# Patient Record
Sex: FEMALE | Race: WHITE | Employment: UNEMPLOYED | URBAN - METROPOLITAN AREA
[De-identification: names, ages, dates, MRNs, and addresses within clinical notes are randomized per-mention and may not be internally consistent; named-entity substitution may affect disease eponyms.]

---

## 2021-09-04 ENCOUNTER — HOSPITAL ENCOUNTER (EMERGENCY)
Age: 21
Discharge: HOME | End: 2021-09-05
Attending: EMERGENCY MEDICINE

## 2021-09-04 DIAGNOSIS — R45.851 SUICIDAL IDEATION: ICD-10-CM

## 2021-09-04 DIAGNOSIS — T74.21XA SEXUAL ASSAULT OF ADULT, INITIAL ENCOUNTER: Primary | ICD-10-CM

## 2021-09-04 DIAGNOSIS — F10.929 ACUTE ALCOHOLIC INTOXICATION WITH COMPLICATION (HCC): ICD-10-CM

## 2021-09-04 LAB
BILIRUBIN URINE: NEGATIVE
BLOOD, URINE: NEGATIVE
CLARITY: CLEAR
COLOR: YELLOW
GLUCOSE URINE: NEGATIVE MG/DL
HCG(URINE) PREGNANCY TEST: NEGATIVE
KETONES, URINE: NEGATIVE MG/DL
LEUKOCYTE ESTERASE, URINE: NEGATIVE
MICROSCOPIC EXAMINATION: NORMAL
NITRITE, URINE: NEGATIVE
PH UA: 7.5 (ref 5–8)
PROTEIN UA: NEGATIVE MG/DL
SPECIFIC GRAVITY UA: 1.01 (ref 1–1.03)
URINE REFLEX TO CULTURE: NORMAL
URINE TYPE: NORMAL
UROBILINOGEN, URINE: 0.2 E.U./DL

## 2021-09-04 PROCEDURE — 99285 EMERGENCY DEPT VISIT HI MDM: CPT

## 2021-09-04 PROCEDURE — 80143 DRUG ASSAY ACETAMINOPHEN: CPT

## 2021-09-04 PROCEDURE — 85025 COMPLETE CBC W/AUTO DIFF WBC: CPT

## 2021-09-04 PROCEDURE — 82077 ASSAY SPEC XCP UR&BREATH IA: CPT

## 2021-09-04 PROCEDURE — 6370000000 HC RX 637 (ALT 250 FOR IP): Performed by: EMERGENCY MEDICINE

## 2021-09-04 PROCEDURE — 84703 CHORIONIC GONADOTROPIN ASSAY: CPT

## 2021-09-04 PROCEDURE — 80179 DRUG ASSAY SALICYLATE: CPT

## 2021-09-04 PROCEDURE — 80307 DRUG TEST PRSMV CHEM ANLYZR: CPT

## 2021-09-04 PROCEDURE — 96372 THER/PROPH/DIAG INJ SC/IM: CPT

## 2021-09-04 PROCEDURE — 81003 URINALYSIS AUTO W/O SCOPE: CPT

## 2021-09-04 PROCEDURE — 80053 COMPREHEN METABOLIC PANEL: CPT

## 2021-09-04 RX ORDER — NICOTINE 21 MG/24HR
1 PATCH, TRANSDERMAL 24 HOURS TRANSDERMAL DAILY
Status: DISCONTINUED | OUTPATIENT
Start: 2021-09-05 | End: 2021-09-04

## 2021-09-04 RX ORDER — NICOTINE 21 MG/24HR
1 PATCH, TRANSDERMAL 24 HOURS TRANSDERMAL ONCE
Status: DISCONTINUED | OUTPATIENT
Start: 2021-09-04 | End: 2021-09-05 | Stop reason: HOSPADM

## 2021-09-05 VITALS
RESPIRATION RATE: 20 BRPM | TEMPERATURE: 97.5 F | HEART RATE: 84 BPM | DIASTOLIC BLOOD PRESSURE: 74 MMHG | OXYGEN SATURATION: 96 % | SYSTOLIC BLOOD PRESSURE: 115 MMHG

## 2021-09-05 LAB
A/G RATIO: 1.6 (ref 1.1–2.2)
ACETAMINOPHEN LEVEL: <5 UG/ML (ref 10–30)
ALBUMIN SERPL-MCNC: 4.6 G/DL (ref 3.4–5)
ALP BLD-CCNC: 71 U/L (ref 40–129)
ALT SERPL-CCNC: 9 U/L (ref 10–40)
AMPHETAMINE SCREEN, URINE: NORMAL
ANION GAP SERPL CALCULATED.3IONS-SCNC: 14 MMOL/L (ref 3–16)
AST SERPL-CCNC: 16 U/L (ref 15–37)
BARBITURATE SCREEN URINE: NORMAL
BASOPHILS ABSOLUTE: 0.1 K/UL (ref 0–0.2)
BASOPHILS RELATIVE PERCENT: 0.9 %
BENZODIAZEPINE SCREEN, URINE: NORMAL
BILIRUB SERPL-MCNC: 0.3 MG/DL (ref 0–1)
BUN BLDV-MCNC: 10 MG/DL (ref 7–20)
CALCIUM SERPL-MCNC: 9.3 MG/DL (ref 8.3–10.6)
CANNABINOID SCREEN URINE: NORMAL
CHLORIDE BLD-SCNC: 106 MMOL/L (ref 99–110)
CO2: 20 MMOL/L (ref 21–32)
COCAINE METABOLITE SCREEN URINE: NORMAL
CREAT SERPL-MCNC: 0.6 MG/DL (ref 0.6–1.1)
EKG ATRIAL RATE: 91 BPM
EKG DIAGNOSIS: NORMAL
EKG P AXIS: 63 DEGREES
EKG P-R INTERVAL: 180 MS
EKG Q-T INTERVAL: 356 MS
EKG QRS DURATION: 92 MS
EKG QTC CALCULATION (BAZETT): 437 MS
EKG R AXIS: 59 DEGREES
EKG T AXIS: 52 DEGREES
EKG VENTRICULAR RATE: 91 BPM
EOSINOPHILS ABSOLUTE: 0.1 K/UL (ref 0–0.6)
EOSINOPHILS RELATIVE PERCENT: 0.5 %
ETHANOL: 246 MG/DL (ref 0–0.08)
GFR AFRICAN AMERICAN: >60
GFR NON-AFRICAN AMERICAN: >60
GLOBULIN: 2.8 G/DL
GLUCOSE BLD-MCNC: 91 MG/DL (ref 70–99)
HCT VFR BLD CALC: 48.3 % (ref 36–48)
HEMOGLOBIN: 16.1 G/DL (ref 12–16)
LYMPHOCYTES ABSOLUTE: 3.8 K/UL (ref 1–5.1)
LYMPHOCYTES RELATIVE PERCENT: 27.6 %
Lab: NORMAL
MCH RBC QN AUTO: 29.9 PG (ref 26–34)
MCHC RBC AUTO-ENTMCNC: 33.2 G/DL (ref 31–36)
MCV RBC AUTO: 90.1 FL (ref 80–100)
METHADONE SCREEN, URINE: NORMAL
MONOCYTES ABSOLUTE: 0.6 K/UL (ref 0–1.3)
MONOCYTES RELATIVE PERCENT: 4.3 %
NEUTROPHILS ABSOLUTE: 9.2 K/UL (ref 1.7–7.7)
NEUTROPHILS RELATIVE PERCENT: 66.7 %
OPIATE SCREEN URINE: NORMAL
OXYCODONE URINE: NORMAL
PDW BLD-RTO: 13.8 % (ref 12.4–15.4)
PH UA: 7.5
PHENCYCLIDINE SCREEN URINE: NORMAL
PLATELET # BLD: 317 K/UL (ref 135–450)
PMV BLD AUTO: 7.4 FL (ref 5–10.5)
POTASSIUM SERPL-SCNC: 4 MMOL/L (ref 3.5–5.1)
PROPOXYPHENE SCREEN: NORMAL
RBC # BLD: 5.36 M/UL (ref 4–5.2)
SALICYLATE, SERUM: <0.3 MG/DL (ref 15–30)
SODIUM BLD-SCNC: 140 MMOL/L (ref 136–145)
TOTAL PROTEIN: 7.4 G/DL (ref 6.4–8.2)
WBC # BLD: 13.8 K/UL (ref 4–11)

## 2021-09-05 PROCEDURE — 93005 ELECTROCARDIOGRAM TRACING: CPT | Performed by: EMERGENCY MEDICINE

## 2021-09-05 PROCEDURE — 6360000002 HC RX W HCPCS: Performed by: EMERGENCY MEDICINE

## 2021-09-05 PROCEDURE — 93010 ELECTROCARDIOGRAM REPORT: CPT | Performed by: INTERNAL MEDICINE

## 2021-09-05 RX ORDER — HALOPERIDOL 5 MG/ML
5 INJECTION INTRAMUSCULAR ONCE
Status: COMPLETED | OUTPATIENT
Start: 2021-09-05 | End: 2021-09-05

## 2021-09-05 RX ORDER — DIPHENHYDRAMINE HYDROCHLORIDE 50 MG/ML
25 INJECTION INTRAMUSCULAR; INTRAVENOUS ONCE
Status: COMPLETED | OUTPATIENT
Start: 2021-09-05 | End: 2021-09-05

## 2021-09-05 RX ORDER — LORAZEPAM 2 MG/ML
2 INJECTION INTRAMUSCULAR ONCE
Status: COMPLETED | OUTPATIENT
Start: 2021-09-05 | End: 2021-09-05

## 2021-09-05 RX ADMIN — HALOPERIDOL LACTATE 5 MG: 5 INJECTION, SOLUTION INTRAMUSCULAR at 00:31

## 2021-09-05 RX ADMIN — LORAZEPAM 2 MG: 2 INJECTION INTRAMUSCULAR; INTRAVENOUS at 00:31

## 2021-09-05 RX ADMIN — DIPHENHYDRAMINE HYDROCHLORIDE 25 MG: 50 INJECTION, SOLUTION INTRAMUSCULAR; INTRAVENOUS at 00:31

## 2021-09-05 NOTE — ED NOTES
Provider instituted suicide precautions, this RN in room with patient as sitter and room made safe. Charge RN notified.       Dane Tamayo RN  09/04/21 0223

## 2021-09-05 NOTE — ED NOTES
Urine and blood work sent. Pt crying in bed. Unable to obtain VS or EKG at this time. Dr. Garo Bailon aware. Awaiting GABI NORTON. Sitter remains at bedside.      Silvia Markham RN  09/04/21 9331

## 2021-09-05 NOTE — ED NOTES
Pt medicated with no complications. Jerry Ferraro RN, Mg Hoffmann, Dr. Marely Goyal, Physician witness to the medication.       Oksana Perdue RN  09/05/21 9412

## 2021-09-05 NOTE — ED NOTES
While this RN in room with JUAN C Herrera [facility] to start suicide precautions and place pt in paper gown and her personal belongings in paper bag, pt actively declined, reporting she would not allow this RN to do anything for her and she did not want this RN to care for her. Melvina Garcia RN notified.       Shaun Rollins RN  09/04/21 0077

## 2021-09-05 NOTE — ED NOTES
JOSH NURSE of [address] [place] [address] contacted and message left for return call.       Sita Nation RN  09/04/21 6704

## 2021-09-05 NOTE — ED NOTES
This RN went to bedside to straight stick pt for labs. Pt refused and starting raising her voice and cussing at myself and the sitter. Pt adamantly refusing blood work, EKG and nicotine patch at this time. Pt states, \"Don't touch me and get the fuck out of my room. \" Charge RN, Fifi, aware and at bedside to speak with pt.      Shaylee Cruz RN  09/04/21 0922

## 2021-09-05 NOTE — ED NOTES
Covered for constant observer during small break. Assisted pt into more comfortable position. Gave 2 glasses of water. Pt tolerated well.      Rutherford Medici Naegele  09/05/21 0451

## 2021-09-05 NOTE — ED NOTES
While this RN was triaging pt, pt declined to answer any more questions, stated \"If I can't have a cigarette, then I am not answering your questions. \"      Connor Herrera RN  09/04/21 5553
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
## 2023-11-22 ENCOUNTER — LAB (OUTPATIENT)
Dept: LAB | Facility: LAB | Age: 23
End: 2023-11-22
Payer: MEDICAID

## 2023-11-22 ENCOUNTER — OFFICE VISIT (OUTPATIENT)
Dept: OBSTETRICS AND GYNECOLOGY | Facility: CLINIC | Age: 23
End: 2023-11-22
Payer: MEDICAID

## 2023-11-22 VITALS
BODY MASS INDEX: 33.31 KG/M2 | WEIGHT: 188 LBS | SYSTOLIC BLOOD PRESSURE: 120 MMHG | HEIGHT: 63 IN | DIASTOLIC BLOOD PRESSURE: 62 MMHG

## 2023-11-22 DIAGNOSIS — N91.2 AMENORRHEA: ICD-10-CM

## 2023-11-22 DIAGNOSIS — N91.2 AMENORRHEA: Primary | ICD-10-CM

## 2023-11-22 LAB
B-HCG SERPL-ACNC: ABNORMAL MIU/ML
SAC DIAMETER: 12.9 MM

## 2023-11-22 PROCEDURE — 76817 TRANSVAGINAL US OBSTETRIC: CPT | Performed by: OBSTETRICS & GYNECOLOGY

## 2023-11-22 PROCEDURE — 84702 CHORIONIC GONADOTROPIN TEST: CPT

## 2023-11-22 PROCEDURE — 1036F TOBACCO NON-USER: CPT | Performed by: OBSTETRICS & GYNECOLOGY

## 2023-11-22 PROCEDURE — 99203 OFFICE O/P NEW LOW 30 MIN: CPT | Performed by: OBSTETRICS & GYNECOLOGY

## 2023-11-22 PROCEDURE — 36415 COLL VENOUS BLD VENIPUNCTURE: CPT

## 2023-11-22 RX ORDER — BUPRENORPHINE HYDROCHLORIDE 8 MG/1
8 TABLET SUBLINGUAL DAILY
COMMUNITY
Start: 2023-11-21

## 2023-11-22 NOTE — PROGRESS NOTES
"  Mony Rea is a 23 y.o. year old female patient.  PCP = No primary care provider on file.    Chief Complaint   Patient presents with    Amenorrhea     New patient here due to amenorrhea. LMP: 09/24/23. Patient had a positive hCG at [facility] on 11/13/23. Patient c/o mild cramps and some breast tenderness. Patient denies any vaginal bleeding, nausea or vomiting.       HPI   Presents stating that she has not had a menstrual flow since September.  She has some mild cramps.  Denies any vaginal bleeding.  Has some breast tenderness.    OB History    No obstetric history on file.         History reviewed. No pertinent past medical history.    History reviewed. No pertinent surgical history.    Review of Systems:   Constitutional: No fever or chills  Respiratory: No shortness of breath, or cough  Cardiovascular: No chest pain or syncope  Breasts: No breast pain, no masses, no nipple discharge  Gastrointestinal: No nausea, vomiting, or diarrhea, no abdominal pain  Genitourinary: No dysuria or frequency  Gynecology: Negative except as noted in history of present illness  All other: All other systems reviewed and negative for complaint    Medication Documentation Review Audit       Reviewed by Kalin Connors MD (Physician) on 11/22/23 at 0942      Medication Order Taking? Sig Documenting Provider Last Dose Status   buprenorphine (Subtex) 8 mg [id number] Yes Place 1 tablet (8 mg) under the tongue once daily. Historical Provider, MD  Active   prenatal vitamin calcium-iron-folic 27 mg iron- 1 mg tablet [id number] Yes Take 1 tablet by mouth once daily. Historical Provider, MD  Active                     /62   Ht 1.6 m (5' 3\")   Wt 85.3 kg (188 lb)   LMP 09/24/2023   BMI 33.30 kg/m²     PHYSICAL EXAMINATION:  PHYSICAL EXAMINATION:  Well-developed, well nourished, in no acute distress, alert and oriented x three, is pleasant and cooperative.  HEENT: Clear. Pupils equal, round and reactive to light and " accommodation. Extraocular muscles are intact. Oral mucosa pink without exudate.   NECK: No lymphadenopathy, no thyromegaly.  LUNGS: Clear bilaterally.  HEART: Regular rate and rhythm without murmurs.  ABDOMEN: Normoactive bowel sounds, soft and nontender, no guarding or rebound tenderness, no CVA tenderness.  EXTREMITIES: No clubbing, cyanosis or edema.  NEUROLOGIC:  Cranial nerves II-XII grossly intact.  :  Normal external female genitalia, normal vulva, normal vagina. Normal urethral meatus, urethra and bladder.  Vaginal ultrasound shows an intrauterine gestational sac at 5 weeks 3 days without evidence of yolk sac or fetal pole.  No fluid in the cul-de-sac.      Problem List Items Addressed This Visit    None  Visit Diagnoses       Amenorrhea    -  Primary    Relevant Orders    hCG, quantitative    hCG, quantitative    US OB transvaginal (Completed)             Provider Impression:  1.  Amenorrhea  We will obtain serial hCGs and follow-up in 2 weeks for repeat ultrasound for viability.

## 2023-11-24 ENCOUNTER — LAB (OUTPATIENT)
Dept: LAB | Facility: LAB | Age: 23
End: 2023-11-24
Payer: MEDICAID

## 2023-11-24 DIAGNOSIS — N91.2 AMENORRHEA: ICD-10-CM

## 2023-11-24 LAB — B-HCG SERPL-ACNC: ABNORMAL MIU/ML

## 2023-11-24 PROCEDURE — 36415 COLL VENOUS BLD VENIPUNCTURE: CPT

## 2023-11-24 PROCEDURE — 84702 CHORIONIC GONADOTROPIN TEST: CPT

## 2023-11-24 NOTE — RESULT ENCOUNTER NOTE
Informed of the serial hCG values which have increased but not the typical doubling that is seen in the early part of pregnancy.  Recommend follow-up hCG on November 29.  Patient voices no complaints at this time.

## 2023-11-29 ENCOUNTER — LAB (OUTPATIENT)
Dept: LAB | Facility: LAB | Age: 23
End: 2023-11-29
Payer: MEDICAID

## 2023-11-29 DIAGNOSIS — N91.2 AMENORRHEA: ICD-10-CM

## 2023-11-29 LAB — B-HCG SERPL-ACNC: ABNORMAL MIU/ML

## 2023-11-29 PROCEDURE — 84702 CHORIONIC GONADOTROPIN TEST: CPT

## 2023-11-29 PROCEDURE — 36415 COLL VENOUS BLD VENIPUNCTURE: CPT

## 2023-11-29 NOTE — RESULT ENCOUNTER NOTE
Talked with patient today regarding the follow-up hCG which shows good rise up to 48,141.  Patient is doing well and she has a follow-up ultrasound for viability scheduled for next week.

## 2023-12-08 ENCOUNTER — OFFICE VISIT (OUTPATIENT)
Dept: OBSTETRICS AND GYNECOLOGY | Facility: CLINIC | Age: 23
End: 2023-12-08
Payer: MEDICAID

## 2023-12-08 VITALS
BODY MASS INDEX: 32.18 KG/M2 | DIASTOLIC BLOOD PRESSURE: 68 MMHG | HEIGHT: 63 IN | SYSTOLIC BLOOD PRESSURE: 112 MMHG | WEIGHT: 181.6 LBS

## 2023-12-08 DIAGNOSIS — N91.2 AMENORRHEA: Primary | ICD-10-CM

## 2023-12-08 LAB — CRL: 14.6 MM

## 2023-12-08 PROCEDURE — 76817 TRANSVAGINAL US OBSTETRIC: CPT | Performed by: OBSTETRICS & GYNECOLOGY

## 2023-12-08 PROCEDURE — 99213 OFFICE O/P EST LOW 20 MIN: CPT | Performed by: OBSTETRICS & GYNECOLOGY

## 2023-12-08 PROCEDURE — 1036F TOBACCO NON-USER: CPT | Performed by: OBSTETRICS & GYNECOLOGY

## 2023-12-08 PROCEDURE — H1000 PRENATAL CARE ATRISK ASSESSM: HCPCS | Performed by: OBSTETRICS & GYNECOLOGY

## 2023-12-08 NOTE — PROGRESS NOTES
"  Mony Rea is a 23 y.o. year old female patient.  PCP = No primary care provider on file.    Chief Complaint   Patient presents with    Pregnancy Ultrasound     Patient is here for viability ultrasound. Patient denies any vaginal bleeding or cramps.       HPI   Presents for transvaginal ultrasound for viability.  She voices no complaints and is doing well.  Denies any vaginal bleeding or abdominal pain.    OB History    No obstetric history on file.         History reviewed. No pertinent past medical history.    History reviewed. No pertinent surgical history.    Review of Systems:   Constitutional: No fever or chills  Respiratory: No shortness of breath, or cough  Cardiovascular: No chest pain or syncope  Breasts: No breast pain, no masses, no nipple discharge  Gastrointestinal: No nausea, vomiting, or diarrhea, no abdominal pain  Genitourinary: No dysuria or frequency  Gynecology: Negative except as noted in history of present illness  All other: All other systems reviewed and negative for complaint    Medication Documentation Review Audit       Reviewed by Kalin Connors MD (Physician) on 12/08/23 at 1321      Medication Order Taking? Sig Documenting Provider Last Dose Status   buprenorphine (Subtex) 8 mg [id number]  Place 1 tablet (8 mg) under the tongue once daily. Historical Provider, MD  Active   prenatal vitamin calcium-iron-folic 27 mg iron- 1 mg tablet [id number]  Take 1 tablet by mouth once daily. Historical Provider, MD  Active                     /68   Ht 1.6 m (5' 3\")   Wt 82.4 kg (181 lb 9.6 oz)   LMP 09/24/2023   BMI 32.17 kg/m²     PHYSICAL EXAMINATION:  Well-developed, well nourished, in no acute distress, alert and oriented x three, is pleasant and cooperative.  HEENT: Clear. Pupils equal, round and reactive to light and accommodation. Extraocular muscles are intact. Oral mucosa pink without exudate.   NECK: No lymphadenopathy, no thyromegaly.  LUNGS: Clear bilaterally.  HEART: " Regular rate and rhythm without murmurs.  ABDOMEN: Normoactive bowel sounds, soft and nontender, no guarding or rebound tenderness, no CVA tenderness.  EXTREMITIES: No clubbing, cyanosis or edema.  NEUROLOGIC:  Cranial nerves II-XII grossly intact.  :  Normal external female genitalia, normal vulva, normal vagina. Normal urethral meatus, urethra and bladder.  Vaginal ultrasound shows a live intrauterine pregnancy at 7 weeks 5 days gestation. EDC is July 21, 2024.    Orders Placed This Encounter   Procedures    US OB transvaginal     Order Specific Question:   Reason for exam:     Answer:   amenorrhea     Order Specific Question:   Radiologist to Determine Optimal Study     Answer:   Yes     Order Specific Question:   [facility]     Answer:   Immediate [1]     Order Specific Question:   Is this exam part of a Research Study? If Yes, link this order to the research study     Answer:   No        Problem List Items Addressed This Visit    None  Visit Diagnoses       Amenorrhea    -  Primary    Relevant Orders    US OB transvaginal (Completed)             Provider Impression:  1.  Amenorrhea  Follow-up in 4 weeks for new OB visit, cultures and prenatal labs.

## 2024-01-05 ENCOUNTER — INITIAL PRENATAL (OUTPATIENT)
Dept: OBSTETRICS AND GYNECOLOGY | Facility: CLINIC | Age: 24
End: 2024-01-05
Payer: MEDICAID

## 2024-01-05 ENCOUNTER — LAB (OUTPATIENT)
Dept: LAB | Facility: LAB | Age: 24
End: 2024-01-05
Payer: MEDICAID

## 2024-01-05 VITALS — DIASTOLIC BLOOD PRESSURE: 58 MMHG | WEIGHT: 178.6 LBS | SYSTOLIC BLOOD PRESSURE: 104 MMHG | BODY MASS INDEX: 31.64 KG/M2

## 2024-01-05 DIAGNOSIS — Z32.01 PREGNANCY TEST POSITIVE (HHS-HCC): ICD-10-CM

## 2024-01-05 DIAGNOSIS — Z32.01 PREGNANCY TEST POSITIVE (HHS-HCC): Primary | ICD-10-CM

## 2024-01-05 DIAGNOSIS — Z3A.11 11 WEEKS GESTATION OF PREGNANCY (HHS-HCC): ICD-10-CM

## 2024-01-05 DIAGNOSIS — O99.322 DRUG USE AFFECTING PREGNANCY IN SECOND TRIMESTER (HHS-HCC): ICD-10-CM

## 2024-01-05 LAB
ABO GROUP (TYPE) IN BLOOD: NORMAL
ANTIBODY SCREEN: NORMAL
ERYTHROCYTE [DISTWIDTH] IN BLOOD BY AUTOMATED COUNT: 11.9 % (ref 11.5–14.5)
HCT VFR BLD AUTO: 36.1 % (ref 36–46)
HGB BLD-MCNC: 12.1 G/DL (ref 12–16)
MCH RBC QN AUTO: 28.8 PG (ref 26–34)
MCHC RBC AUTO-ENTMCNC: 33.5 G/DL (ref 32–36)
MCV RBC AUTO: 86 FL (ref 80–100)
NRBC BLD-RTO: 0 /100 WBCS (ref 0–0)
PLATELET # BLD AUTO: 253 X10*3/UL (ref 150–450)
RBC # BLD AUTO: 4.2 X10*6/UL (ref 4–5.2)
RH FACTOR (ANTIGEN D): NORMAL
WBC # BLD AUTO: 9 X10*3/UL (ref 4.4–11.3)

## 2024-01-05 PROCEDURE — 80307 DRUG TEST PRSMV CHEM ANLYZR: CPT

## 2024-01-05 PROCEDURE — 99213 OFFICE O/P EST LOW 20 MIN: CPT | Performed by: OBSTETRICS & GYNECOLOGY

## 2024-01-05 PROCEDURE — 87086 URINE CULTURE/COLONY COUNT: CPT

## 2024-01-05 PROCEDURE — 87340 HEPATITIS B SURFACE AG IA: CPT

## 2024-01-05 PROCEDURE — 87624 HPV HI-RISK TYP POOLED RSLT: CPT

## 2024-01-05 PROCEDURE — 87800 DETECT AGNT MULT DNA DIREC: CPT

## 2024-01-05 PROCEDURE — 86901 BLOOD TYPING SEROLOGIC RH(D): CPT

## 2024-01-05 PROCEDURE — 87389 HIV-1 AG W/HIV-1&-2 AB AG IA: CPT

## 2024-01-05 PROCEDURE — 86850 RBC ANTIBODY SCREEN: CPT

## 2024-01-05 PROCEDURE — 88175 CYTOPATH C/V AUTO FLUID REDO: CPT

## 2024-01-05 PROCEDURE — 86317 IMMUNOASSAY INFECTIOUS AGENT: CPT

## 2024-01-05 PROCEDURE — 86780 TREPONEMA PALLIDUM: CPT

## 2024-01-05 PROCEDURE — 86900 BLOOD TYPING SEROLOGIC ABO: CPT

## 2024-01-05 PROCEDURE — 85027 COMPLETE CBC AUTOMATED: CPT

## 2024-01-05 PROCEDURE — 88141 CYTOPATH C/V INTERPRET: CPT | Performed by: PATHOLOGY

## 2024-01-05 PROCEDURE — 36415 COLL VENOUS BLD VENIPUNCTURE: CPT

## 2024-01-05 RX ORDER — METOCLOPRAMIDE 5 MG/1
5 TABLET ORAL 3 TIMES DAILY PRN
COMMUNITY
Start: 2023-12-11 | End: 2024-01-10

## 2024-01-05 NOTE — PROGRESS NOTES
Subjective   Patient ID: Mony Rea is a 23 y.o. female who presents for Initial Prenatal Visit (Patient is here for initial Prenatal visit. Patient has no concerns today. Glucose and Protein are negative in urine. ).  WARD Manning is a  1 para 0 who comes in at 11+ weeks gestation to start prenatal care.  She reports that overall she keeps most of what she eats down.  Denies any cramping bleeding or abnormal discharge but does report some breast tenderness.  She has not started taking her prenatal vitamins yet.  This is her first pregnancy.  She was previously on Suboxone and was recently switched to Subutex  Review of Systems  Denies any recent health changes  Respiratory denies any shortness of breath  Cardiovascular denies any chest pain or palpitations  GI denies any changes in bowel habits or abdominal pain   denies any new issues  Musculoskeletal denies any changes in her mobility  Psych denies any changes in her mood or in her sleep  Objective   Physical Exam  Pleasant in no acute distress  Head and neck supple without adenopathy  Lungs clear to auscultation  Heart regular rate and rhythm  Breast symmetrical no masses discharge retraction or skin changes  Abdomen soft nontender  External genitalia revealed no lesions the vagina was well estrogenized the cervix was nonfriable    Transabdominal ultrasound revealed a viable pregnancy with mobility and fetal heart tones at 158  Assessment/Plan     Kerri is a 23-year-old who comes in at 11+ weeks to establish prenatal care.  Full exam today was benign fetal heart tones were reassuring will get prenatal labs today patient advised to stop vaping.  Patient desires prequel scoring a screen we will do genetic screening as well as full prenatal labs.  Follow-up in 4 weeks       Karla Whitaker MD 24 2:15 PM

## 2024-01-06 LAB
AMPHETAMINES UR QL SCN: NORMAL
BARBITURATES UR QL SCN: NORMAL
BENZODIAZ UR QL SCN: NORMAL
BZE UR QL SCN: NORMAL
C TRACH RRNA SPEC QL NAA+PROBE: NEGATIVE
CANNABINOIDS UR QL SCN: NORMAL
FENTANYL+NORFENTANYL UR QL SCN: NORMAL
HBV SURFACE AG SERPL QL IA: NONREACTIVE
HIV 1+2 AB+HIV1 P24 AG SERPL QL IA: NONREACTIVE
N GONORRHOEA DNA SPEC QL PROBE+SIG AMP: NEGATIVE
OPIATES UR QL SCN: NORMAL
OXYCODONE+OXYMORPHONE UR QL SCN: NORMAL
PCP UR QL SCN: NORMAL
REFLEX ADDED, ANEMIA PANEL: NORMAL
RUBV IGG SERPL IA-ACNC: 1.5 IA
RUBV IGG SERPL QL IA: POSITIVE
T PALLIDUM AB SER QL: NONREACTIVE

## 2024-01-07 LAB — BACTERIA UR CULT: NORMAL

## 2024-01-19 ENCOUNTER — TELEPHONE (OUTPATIENT)
Dept: OBSTETRICS AND GYNECOLOGY | Facility: CLINIC | Age: 24
End: 2024-01-19
Payer: MEDICAID

## 2024-01-19 NOTE — TELEPHONE ENCOUNTER
----- Message from Karla Whitaker MD sent at 1/19/2024  9:58 AM EST -----  Please inform Kerri that her genetic screening came back normal and if she wishes to know the sex of the baby it is a boy

## 2024-01-31 DIAGNOSIS — B96.89 BV (BACTERIAL VAGINOSIS): Primary | ICD-10-CM

## 2024-01-31 DIAGNOSIS — N76.0 BV (BACTERIAL VAGINOSIS): Primary | ICD-10-CM

## 2024-01-31 RX ORDER — METRONIDAZOLE 500 MG/1
500 TABLET ORAL 2 TIMES DAILY
Qty: 14 TABLET | Refills: 0 | Status: SHIPPED | OUTPATIENT
Start: 2024-01-31 | End: 2024-02-07

## 2024-02-02 ENCOUNTER — ROUTINE PRENATAL (OUTPATIENT)
Dept: OBSTETRICS AND GYNECOLOGY | Facility: CLINIC | Age: 24
End: 2024-02-02
Payer: MEDICAID

## 2024-02-02 VITALS — BODY MASS INDEX: 31.32 KG/M2 | DIASTOLIC BLOOD PRESSURE: 64 MMHG | SYSTOLIC BLOOD PRESSURE: 110 MMHG | WEIGHT: 176.8 LBS

## 2024-02-02 DIAGNOSIS — Z3A.15 15 WEEKS GESTATION OF PREGNANCY (HHS-HCC): Primary | ICD-10-CM

## 2024-02-02 PROCEDURE — 99213 OFFICE O/P EST LOW 20 MIN: CPT | Performed by: OBSTETRICS & GYNECOLOGY

## 2024-02-02 NOTE — PROGRESS NOTES
Subjective   Patient ID [id number]   Mony Rea is a 24 y.o.  at 15w5d with a working estimated date of delivery of 2024, by Ultrasound who presents for a routine prenatal visit. She denies vaginal bleeding, leakage of fluid, decreased fetal movements, or contractions.    Chief Complaint   Patient presents with    Routine Prenatal Visit     Patient has no concerns at this time. Patient unable to leave a urine sample today.           Objective   Physical Exam  Weight: 80.2 kg (176 lb 12.8 oz)  Expected Total Weight Gain: 5 kg (11 lb)-9 kg (19 lb)   Pregravid BMI: 31.54  BP: 110/64  Fetal Heart Rate: 150 Fundal Height (cm): 15 cm    Prenatal Labs    Lab Results   Component Value Date    HGB 12.1 2024    HCT 36.1 2024    ABO A 2024    HEPBSAG Nonreactive 2024       Assessment/Plan   Problem List Items Addressed This Visit    None  Visit Diagnoses       15 weeks gestation of pregnancy    -  Primary    Relevant Orders    US OB 14+ weeks anatomy scan            Continue prenatal vitamin.  Labs reviewed.    Patient had the prequel testing which was normal.  Recent Pap smear shows mild dysplasia and evidence of bacterial vaginosis.  Patient has noticed some slight vaginal discharge.  A prescription for Flagyl will be sent to the pharmacy.  Patient informed of the Pap smear results and recommend scheduling colposcopy.  Follow up in 4 weeks for a routine prenatal visit.

## 2024-03-01 ENCOUNTER — HOSPITAL ENCOUNTER (OUTPATIENT)
Dept: RADIOLOGY | Facility: HOSPITAL | Age: 24
Discharge: HOME | End: 2024-03-01
Payer: MEDICAID

## 2024-03-01 ENCOUNTER — ROUTINE PRENATAL (OUTPATIENT)
Dept: OBSTETRICS AND GYNECOLOGY | Facility: CLINIC | Age: 24
End: 2024-03-01
Payer: MEDICAID

## 2024-03-01 VITALS — DIASTOLIC BLOOD PRESSURE: 64 MMHG | BODY MASS INDEX: 30.93 KG/M2 | SYSTOLIC BLOOD PRESSURE: 120 MMHG | WEIGHT: 174.6 LBS

## 2024-03-01 DIAGNOSIS — Z3A.15 15 WEEKS GESTATION OF PREGNANCY (HHS-HCC): ICD-10-CM

## 2024-03-01 DIAGNOSIS — Z3A.19 19 WEEKS GESTATION OF PREGNANCY (HHS-HCC): Primary | ICD-10-CM

## 2024-03-01 PROCEDURE — 99213 OFFICE O/P EST LOW 20 MIN: CPT | Performed by: OBSTETRICS & GYNECOLOGY

## 2024-03-01 PROCEDURE — 76805 OB US >/= 14 WKS SNGL FETUS: CPT

## 2024-03-01 PROCEDURE — 76805 OB US >/= 14 WKS SNGL FETUS: CPT | Performed by: RADIOLOGY

## 2024-03-01 NOTE — PROGRESS NOTES
Subjective   Patient ID [id number]   Mony Rea is a 24 y.o.  at 19w5d with a working estimated date of delivery of 2024, by Ultrasound who presents for a routine prenatal visit. She denies vaginal bleeding, leakage of fluid, decreased fetal movements, or contractions.    Chief Complaint   Patient presents with    Routine Prenatal Visit     Patient is here for an OB visit. Patient unable to leave a urine today.           Objective   Physical Exam  Weight: 79.2 kg (174 lb 9.6 oz)  Expected Total Weight Gain: 5 kg (11 lb)-9 kg (19 lb)   Pregravid BMI: 31.54  BP: 120/64  Fetal Heart Rate: 150 Fundal Height (cm): 19 cm    Prenatal Labs    Lab Results   Component Value Date    HGB 12.1 2024    HCT 36.1 2024    ABO A 2024    HEPBSAG Nonreactive 2024       Assessment/Plan   Problem List Items Addressed This Visit    None  Visit Diagnoses       19 weeks gestation of pregnancy    -  Primary            Continue prenatal vitamin.  Labs reviewed.    Follow up in 4 weeks for a routine prenatal visit.

## 2024-03-06 ENCOUNTER — PROCEDURE VISIT (OUTPATIENT)
Dept: OBSTETRICS AND GYNECOLOGY | Facility: CLINIC | Age: 24
End: 2024-03-06
Payer: MEDICAID

## 2024-03-06 VITALS — BODY MASS INDEX: 30.82 KG/M2 | WEIGHT: 174 LBS | DIASTOLIC BLOOD PRESSURE: 62 MMHG | SYSTOLIC BLOOD PRESSURE: 108 MMHG

## 2024-03-06 DIAGNOSIS — Z3A.20 20 WEEKS GESTATION OF PREGNANCY (HHS-HCC): Primary | ICD-10-CM

## 2024-03-06 DIAGNOSIS — N87.0 CIN I (CERVICAL INTRAEPITHELIAL NEOPLASIA I): ICD-10-CM

## 2024-03-06 PROCEDURE — 57452 EXAM OF CERVIX W/SCOPE: CPT | Performed by: OBSTETRICS & GYNECOLOGY

## 2024-03-06 NOTE — PROGRESS NOTES
Patient ID: Mony Rea is a 24 y.o. female.  Presents for colposcopy due to mild dysplasia.  She feels fetal movement.  Chief Complaint   Patient presents with    Colposcopy     OB patient here for colp. 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 Patient is unable to leave a urine sample today. Patient has no concerns at this time.        /62   Wt 78.9 kg (174 lb)   LMP 09/24/2023   BMI 30.82 kg/m²      Colposcopy    Date/Time: 3/6/2024 1:23 PM    Performed by: Kalin Connors MD  Authorized by: Kalin Connors MD    Procedure location: cervix    Consent:     Patient questions answered: yes      Consent obtained:  Verbal    Consent given by:  Patient  Indication:     Cervical indication(s): RAYSHAWN 1    Pre-procedure:     Prep solution(s): acetic acid    Procedure:     Colposcopy with: colposcopy only      Biopsy taken: no      Cervix visibility: fully visualized      SCJ visibility: fully visualized      Lesion visualized: fully visualized      Acetowhite lesion(s): cervix    Post-procedure:     Patient tolerance of procedure:  Patient tolerated the procedure well with no immediate complications  Comments:      Recommend follow-up colposcopy after delivery of the pregnancy.  Follow-up in several weeks for her routine OB visit.      Physical Exam  Genitourinary:

## 2024-03-29 ENCOUNTER — ROUTINE PRENATAL (OUTPATIENT)
Dept: OBSTETRICS AND GYNECOLOGY | Facility: CLINIC | Age: 24
End: 2024-03-29
Payer: MEDICAID

## 2024-03-29 VITALS — WEIGHT: 178.2 LBS | BODY MASS INDEX: 31.57 KG/M2 | DIASTOLIC BLOOD PRESSURE: 68 MMHG | SYSTOLIC BLOOD PRESSURE: 110 MMHG

## 2024-03-29 DIAGNOSIS — Z3A.23 23 WEEKS GESTATION OF PREGNANCY (HHS-HCC): Primary | ICD-10-CM

## 2024-03-29 PROCEDURE — 99213 OFFICE O/P EST LOW 20 MIN: CPT | Performed by: OBSTETRICS & GYNECOLOGY

## 2024-03-29 NOTE — PROGRESS NOTES
Subjective   Patient ID [id number]   Mony Rea is a 24 y.o.  at 23w5d with a working estimated date of delivery of 2024, by Ultrasound who presents for a routine prenatal visit. She denies vaginal bleeding, leakage of fluid, decreased fetal movements, or contractions.    Chief Complaint   Patient presents with    Routine Prenatal Visit     Patient has no concerns at this time. Urine is negative for glucose and protein. Patient given information on the 1 hour GTT.        Objective   Physical Exam  Weight: 80.8 kg (178 lb 3.2 oz)  Expected Total Weight Gain: 5 kg (11 lb)-9 kg (19 lb)   Pregravid BMI: 31.54  BP: 110/68  Fetal Heart Rate: 146 Fundal Height (cm): 23 cm    Prenatal Labs    Lab Results   Component Value Date    HGB 12.1 2024    HCT 36.1 2024    ABO A 2024    HEPBSAG Nonreactive 2024       Assessment/Plan       Continue prenatal vitamin.  Labs reviewed.    Follow up in 4 weeks for a routine prenatal visit.

## 2024-04-04 ENCOUNTER — HOSPITAL ENCOUNTER (EMERGENCY)
Facility: HOSPITAL | Age: 24
Discharge: OTHER INSTITUTION | End: 2024-04-04
Attending: EMERGENCY MEDICINE
Payer: MEDICAID

## 2024-04-04 VITALS
DIASTOLIC BLOOD PRESSURE: 84 MMHG | OXYGEN SATURATION: 100 % | TEMPERATURE: 97.6 F | RESPIRATION RATE: 18 BRPM | WEIGHT: 180 LBS | HEIGHT: 63 IN | SYSTOLIC BLOOD PRESSURE: 138 MMHG | BODY MASS INDEX: 31.89 KG/M2 | HEART RATE: 96 BPM

## 2024-04-04 DIAGNOSIS — Z3A.24 24 WEEKS GESTATION OF PREGNANCY (HHS-HCC): Primary | ICD-10-CM

## 2024-04-04 DIAGNOSIS — O46.92 VAGINAL BLEEDING IN PREGNANCY, SECOND TRIMESTER (HHS-HCC): ICD-10-CM

## 2024-04-04 PROCEDURE — 99281 EMR DPT VST MAYX REQ PHY/QHP: CPT

## 2024-04-04 ASSESSMENT — COLUMBIA-SUICIDE SEVERITY RATING SCALE - C-SSRS
1. IN THE PAST MONTH, HAVE YOU WISHED YOU WERE DEAD OR WISHED YOU COULD GO TO SLEEP AND NOT WAKE UP?: NO
2. HAVE YOU ACTUALLY HAD ANY THOUGHTS OF KILLING YOURSELF?: NO
6. HAVE YOU EVER DONE ANYTHING, STARTED TO DO ANYTHING, OR PREPARED TO DO ANYTHING TO END YOUR LIFE?: NO

## 2024-04-04 ASSESSMENT — PAIN SCALES - GENERAL
PAINLEVEL_OUTOF10: 0 - NO PAIN
PAINLEVEL_OUTOF10: 1

## 2024-04-04 ASSESSMENT — PAIN - FUNCTIONAL ASSESSMENT
PAIN_FUNCTIONAL_ASSESSMENT: 0-10
PAIN_FUNCTIONAL_ASSESSMENT: 0-10

## 2024-04-04 ASSESSMENT — PAIN DESCRIPTION - LOCATION: LOCATION: ABDOMEN

## 2024-04-04 NOTE — ED PROVIDER NOTES
Vaginal bleeding.  This 24-year-old white female who is 24 weeks 4 days pregnant presents to the ED with a gush of blood that occurred at 7 AM this morning.  She states that she has not had any further bleeding since then she does admit to some bilateral groin pain and she does admit to having sexual intercourse last evening.  She normally sees Dr. Connors but is supposed to deliver at [place].  She is  1 para 0 Ab0.  Believes she is a Rh-.  She denies any further bleeding since the initial gush of blood.  She denies any contractions.      History provided by:  Patient   used: No         Physical Exam  Vitals and nursing note reviewed.   Constitutional:       General: She is awake.      Appearance: Normal appearance. She is normal weight.   HENT:      Head: Normocephalic and atraumatic.      Right Ear: Hearing and external ear normal.      Left Ear: Hearing and external ear normal.      Nose: Nose normal. No congestion or rhinorrhea.      Mouth/Throat:      Lips: Pink.      Mouth: Mucous membranes are moist.      Pharynx: Oropharynx is clear. No oropharyngeal exudate or posterior oropharyngeal erythema.   Eyes:      General: Lids are normal.         Right eye: No discharge.         Left eye: No discharge.      Extraocular Movements: Extraocular movements intact.      Conjunctiva/sclera: Conjunctivae normal.      Pupils: Pupils are equal, round, and reactive to light.   Cardiovascular:      Rate and Rhythm: Normal rate and regular rhythm.      Pulses: Normal pulses.      Heart sounds: Normal heart sounds. No murmur heard.     No friction rub. No gallop.   Pulmonary:      Effort: Pulmonary effort is normal. No respiratory distress.      Breath sounds: Normal breath sounds. No stridor. No wheezing, rhonchi or rales.   Chest:      Chest wall: No tenderness.   Abdominal:      General: Abdomen is protuberant. Bowel sounds are normal. There is no distension.      Palpations: Abdomen is soft.  There is no mass.      Tenderness: There is no abdominal tenderness. There is no guarding or rebound.      Hernia: No hernia is present.      Comments: Patient's abdomen is noted to be gravid nontender without evidence of contractions during evaluation.  Bowel sounds present all 4 quadrants.   Musculoskeletal:         General: No swelling, tenderness, deformity or signs of injury. Normal range of motion.      Cervical back: Full passive range of motion without pain, normal range of motion and neck supple.      Right lower leg: No edema.      Left lower leg: No edema.   Skin:     General: Skin is warm and dry.      Capillary Refill: Capillary refill takes less than 2 seconds.      Coloration: Skin is not jaundiced or pale.      Findings: No bruising, erythema, lesion or rash.   Neurological:      General: No focal deficit present.      Mental Status: She is alert and oriented to person, place, and time.      GCS: GCS eye subscore is 4. GCS verbal subscore is 5. GCS motor subscore is 6.      Cranial Nerves: Cranial nerves 2-12 are intact. No cranial nerve deficit.      Sensory: Sensation is intact. No sensory deficit.      Motor: Motor function is intact. No weakness.      Coordination: Coordination is intact. Coordination normal.      Deep Tendon Reflexes: Reflexes normal.   Psychiatric:         Attention and Perception: Attention and perception normal.         Mood and Affect: Mood and affect normal.         Speech: Speech normal.         Behavior: Behavior normal. Behavior is cooperative.         Thought Content: Thought content normal.         Judgment: Judgment normal.          Labs Reviewed - No data to display     No orders to display        Procedures     Medical Decision Making  Evaluated the patient I contacted the [facility] so that I could have the patient seen and evaluated the OB department at [facility] for suspected ultrasound and treatment with RhoGAM.  8:08 AM I had a  discussion with Dr. Espana -  OB/GYN on-call at [facility] and he accepted the patient to be transferred there.  The patient then signed the EMTALA form and was told to drive directly to the OB/GYN department at [facility] for evaluation.  Patient had gone to the bathroom prior to leaving our department and had no further complaints of vaginal bleeding at this point in time.         Diagnoses as of 04/04/24 0812   24 weeks gestation of pregnancy   Vaginal bleeding in pregnancy, second trimester                    Cabrera Kern, DO  04/07/24 1044

## 2024-04-26 ENCOUNTER — LAB (OUTPATIENT)
Dept: LAB | Facility: LAB | Age: 24
End: 2024-04-26
Payer: MEDICAID

## 2024-04-26 ENCOUNTER — ROUTINE PRENATAL (OUTPATIENT)
Dept: OBSTETRICS AND GYNECOLOGY | Facility: CLINIC | Age: 24
End: 2024-04-26
Payer: MEDICAID

## 2024-04-26 VITALS — BODY MASS INDEX: 32.31 KG/M2 | DIASTOLIC BLOOD PRESSURE: 70 MMHG | SYSTOLIC BLOOD PRESSURE: 112 MMHG | WEIGHT: 182.4 LBS

## 2024-04-26 DIAGNOSIS — Z3A.27 27 WEEKS GESTATION OF PREGNANCY (HHS-HCC): ICD-10-CM

## 2024-04-26 LAB
ABO GROUP (TYPE) IN BLOOD: NORMAL
ANTIBODY SCREEN: NORMAL
ERYTHROCYTE [DISTWIDTH] IN BLOOD BY AUTOMATED COUNT: 12.4 % (ref 11.5–14.5)
GLUCOSE 1H P 50 G GLC PO SERPL-MCNC: 106 MG/DL
HCT VFR BLD AUTO: 34.4 % (ref 36–46)
HGB BLD-MCNC: 11.3 G/DL (ref 12–16)
MCH RBC QN AUTO: 29.5 PG (ref 26–34)
MCHC RBC AUTO-ENTMCNC: 32.8 G/DL (ref 32–36)
MCV RBC AUTO: 90 FL (ref 80–100)
NRBC BLD-RTO: 0 /100 WBCS (ref 0–0)
PLATELET # BLD AUTO: 259 X10*3/UL (ref 150–450)
RBC # BLD AUTO: 3.83 X10*6/UL (ref 4–5.2)
RH FACTOR (ANTIGEN D): NORMAL
WBC # BLD AUTO: 13.8 X10*3/UL (ref 4.4–11.3)

## 2024-04-26 PROCEDURE — 36415 COLL VENOUS BLD VENIPUNCTURE: CPT

## 2024-04-26 PROCEDURE — 86850 RBC ANTIBODY SCREEN: CPT

## 2024-04-26 PROCEDURE — 99213 OFFICE O/P EST LOW 20 MIN: CPT | Performed by: OBSTETRICS & GYNECOLOGY

## 2024-04-26 PROCEDURE — 86900 BLOOD TYPING SEROLOGIC ABO: CPT

## 2024-04-26 PROCEDURE — 85027 COMPLETE CBC AUTOMATED: CPT

## 2024-04-26 PROCEDURE — 82947 ASSAY GLUCOSE BLOOD QUANT: CPT

## 2024-04-26 PROCEDURE — 86901 BLOOD TYPING SEROLOGIC RH(D): CPT

## 2024-04-26 NOTE — PROGRESS NOTES
Subjective   Patient ID [id number]   Mony Antonio is a 24 y.o.  at 27w5d with a working estimated date of delivery of 2024, by Ultrasound who presents for a routine prenatal visit. She denies vaginal bleeding, leakage of fluid, decreased fetal movements, or contractions.  Patient received the RhoGAM injection at [facility] on  due to postcoital spotting.  She denies any subsequent bleeding.    Chief Complaint   Patient presents with    Routine Prenatal Visit     Patient here for routine prenatal. Patient received RHoGam on 2024 at [facility]. Patient unable to leave a urine sample.           Objective   Physical Exam  Weight: 82.7 kg (182 lb 6.4 oz)  Expected Total Weight Gain: 5 kg (11 lb)-9 kg (19 lb)   Pregravid BMI: 31.54  BP: 112/70         Prenatal Labs    Lab Results   Component Value Date    HGB 12.1 2024    HCT 36.1 2024    ABO A 2024    HEPBSAG Nonreactive 2024       Assessment/Plan   Problem List Items Addressed This Visit    None  Visit Diagnoses       27 weeks gestation of pregnancy ([facility])                Continue prenatal vitamin.  Labs reviewed.    Follow up in 2 weeks for a routine prenatal visit.

## 2024-04-27 LAB
BB ANTIBODY IDENTIFICATION: NORMAL
CASE #: NORMAL
REFLEX ADDED, ANEMIA PANEL: NORMAL

## 2024-05-10 ENCOUNTER — ROUTINE PRENATAL (OUTPATIENT)
Dept: OBSTETRICS AND GYNECOLOGY | Facility: CLINIC | Age: 24
End: 2024-05-10
Payer: MEDICAID

## 2024-05-10 VITALS — SYSTOLIC BLOOD PRESSURE: 110 MMHG | WEIGHT: 184.2 LBS | DIASTOLIC BLOOD PRESSURE: 66 MMHG | BODY MASS INDEX: 32.63 KG/M2

## 2024-05-10 DIAGNOSIS — Z3A.29 29 WEEKS GESTATION OF PREGNANCY (HHS-HCC): Primary | ICD-10-CM

## 2024-05-10 PROCEDURE — 99213 OFFICE O/P EST LOW 20 MIN: CPT | Performed by: OBSTETRICS & GYNECOLOGY

## 2024-05-10 NOTE — PROGRESS NOTES
Subjective   Patient ID [id number]   Mony Rea is a 24 y.o.  at 29w5d with a working estimated date of delivery of 2024, by Ultrasound who presents for a routine prenatal visit. She denies vaginal bleeding, leakage of fluid, decreased fetal movements, or contractions.    Chief Complaint   Patient presents with    Routine Prenatal Visit     Patient has no concerns at this time. Urine is negative for glucose and protein.          Objective   Physical Exam  Weight: 83.6 kg (184 lb 3.2 oz)  Expected Total Weight Gain: 5 kg (11 lb)-9 kg (19 lb)   Pregravid BMI: 31.54  BP: 110/66  Fetal Heart Rate: 142 Fundal Height (cm): 29 cm    Prenatal Labs    Lab Results   Component Value Date    HGB 11.3 (L) 2024    HCT 34.4 (L) 2024    ABO A 2024    HEPBSAG Nonreactive 2024       Assessment/Plan   Problem List Items Addressed This Visit    None  Visit Diagnoses       29 weeks gestation of pregnancy ([facility])    -  Primary            Continue prenatal vitamin.  Labs reviewed.    Patient requests a note restricting work to 8-hour shifts.  Follow up in 2 weeks for a routine prenatal visit.

## 2024-05-10 NOTE — LETTER
May 10, 2024     Patient: Mony Rea   YOB: 2000   Date of Visit: 5/10/2024       To Whom It May Concern:    It is my medical opinion that Mony Rea should start working eight hour shifts for the remainder of her pregnancy.     If you have any questions or concerns, please don't hesitate to call.         Sincerely,        Kalin Connors MD    CC: No Recipients

## 2024-05-17 DIAGNOSIS — Z11.3 ENCOUNTER FOR SCREENING FOR INFECTIONS WITH A PREDOMINANTLY SEXUAL MODE OF TRANSMISSION: ICD-10-CM

## 2024-05-17 DIAGNOSIS — F11.20 OPIOID DEPENDENCE, UNCOMPLICATED (MULTI): Primary | ICD-10-CM

## 2024-05-17 DIAGNOSIS — Z11.59 ENCOUNTER FOR SCREENING FOR OTHER VIRAL DISEASES: ICD-10-CM

## 2024-05-17 DIAGNOSIS — Z13.29 ENCOUNTER FOR SCREENING FOR OTHER SUSPECTED ENDOCRINE DISORDER: ICD-10-CM

## 2024-05-17 DIAGNOSIS — Z13.228 ENCOUNTER FOR SCREENING FOR OTHER METABOLIC DISORDERS: ICD-10-CM

## 2024-05-20 ENCOUNTER — LAB (OUTPATIENT)
Dept: LAB | Facility: LAB | Age: 24
End: 2024-05-20
Payer: MEDICAID

## 2024-05-20 DIAGNOSIS — Z13.228 ENCOUNTER FOR SCREENING FOR OTHER METABOLIC DISORDERS: ICD-10-CM

## 2024-05-20 DIAGNOSIS — Z13.29 ENCOUNTER FOR SCREENING FOR OTHER SUSPECTED ENDOCRINE DISORDER: ICD-10-CM

## 2024-05-20 DIAGNOSIS — Z11.3 ENCOUNTER FOR SCREENING FOR INFECTIONS WITH A PREDOMINANTLY SEXUAL MODE OF TRANSMISSION: ICD-10-CM

## 2024-05-20 DIAGNOSIS — F11.20 OPIOID DEPENDENCE, UNCOMPLICATED (MULTI): ICD-10-CM

## 2024-05-20 DIAGNOSIS — Z11.59 ENCOUNTER FOR SCREENING FOR OTHER VIRAL DISEASES: ICD-10-CM

## 2024-05-20 LAB
25(OH)D3 SERPL-MCNC: 33 NG/ML (ref 30–100)
ALBUMIN SERPL BCP-MCNC: 3.4 G/DL (ref 3.4–5)
ALP SERPL-CCNC: 73 U/L (ref 33–110)
ALT SERPL W P-5'-P-CCNC: 10 U/L (ref 7–45)
AMPHETAMINES UR QL SCN: NORMAL
ANION GAP SERPL CALC-SCNC: 12 MMOL/L (ref 10–20)
AST SERPL W P-5'-P-CCNC: 14 U/L (ref 9–39)
BARBITURATES UR QL SCN: NORMAL
BASOPHILS # BLD AUTO: 0.04 X10*3/UL (ref 0–0.1)
BASOPHILS NFR BLD AUTO: 0.3 %
BENZODIAZ UR QL SCN: NORMAL
BILIRUB DIRECT SERPL-MCNC: 0.1 MG/DL (ref 0–0.3)
BILIRUB SERPL-MCNC: 0.3 MG/DL (ref 0–1.2)
BUN SERPL-MCNC: 9 MG/DL (ref 6–23)
BZE UR QL SCN: NORMAL
CALCIUM SERPL-MCNC: 8.9 MG/DL (ref 8.6–10.3)
CANNABINOIDS UR QL SCN: NORMAL
CHLORIDE SERPL-SCNC: 104 MMOL/L (ref 98–107)
CHOLEST SERPL-MCNC: 174 MG/DL (ref 0–199)
CHOLESTEROL/HDL RATIO: 3.1
CO2 SERPL-SCNC: 24 MMOL/L (ref 21–32)
CREAT SERPL-MCNC: 0.6 MG/DL (ref 0.5–1.05)
EGFRCR SERPLBLD CKD-EPI 2021: >90 ML/MIN/1.73M*2
EOSINOPHIL # BLD AUTO: 0.09 X10*3/UL (ref 0–0.7)
EOSINOPHIL NFR BLD AUTO: 0.8 %
ERYTHROCYTE [DISTWIDTH] IN BLOOD BY AUTOMATED COUNT: 12.4 % (ref 11.5–14.5)
EST. AVERAGE GLUCOSE BLD GHB EST-MCNC: 94 MG/DL
FENTANYL+NORFENTANYL UR QL SCN: NORMAL
GLUCOSE SERPL-MCNC: 74 MG/DL (ref 74–99)
HAV IGM SER QL: NONREACTIVE
HBA1C MFR BLD: 4.9 %
HBV CORE IGM SER QL: NONREACTIVE
HBV SURFACE AG SERPL QL IA: NONREACTIVE
HCG UR QL IA.RAPID: POSITIVE
HCT VFR BLD AUTO: 33.8 % (ref 36–46)
HCV AB SER QL: NONREACTIVE
HDLC SERPL-MCNC: 57 MG/DL
HGB BLD-MCNC: 11.4 G/DL (ref 12–16)
HIV 1+2 AB+HIV1 P24 AG SERPL QL IA: NONREACTIVE
IMM GRANULOCYTES # BLD AUTO: 0.04 X10*3/UL (ref 0–0.7)
IMM GRANULOCYTES NFR BLD AUTO: 0.3 % (ref 0–0.9)
LDLC SERPL CALC-MCNC: 78 MG/DL
LYMPHOCYTES # BLD AUTO: 2.27 X10*3/UL (ref 1.2–4.8)
LYMPHOCYTES NFR BLD AUTO: 19.8 %
MCH RBC QN AUTO: 30 PG (ref 26–34)
MCHC RBC AUTO-ENTMCNC: 33.7 G/DL (ref 32–36)
MCV RBC AUTO: 89 FL (ref 80–100)
METHADONE UR QL SCN: NORMAL
MONOCYTES # BLD AUTO: 0.76 X10*3/UL (ref 0.1–1)
MONOCYTES NFR BLD AUTO: 6.6 %
NEUTROPHILS # BLD AUTO: 8.26 X10*3/UL (ref 1.2–7.7)
NEUTROPHILS NFR BLD AUTO: 72.2 %
NON HDL CHOLESTEROL: 117 MG/DL (ref 0–149)
NRBC BLD-RTO: 0 /100 WBCS (ref 0–0)
OPIATES UR QL SCN: NORMAL
OXYCODONE+OXYMORPHONE UR QL SCN: NORMAL
PCP UR QL SCN: NORMAL
PLATELET # BLD AUTO: 242 X10*3/UL (ref 150–450)
POTASSIUM SERPL-SCNC: 4.1 MMOL/L (ref 3.5–5.3)
PROT SERPL-MCNC: 5.6 G/DL (ref 6.4–8.2)
RBC # BLD AUTO: 3.8 X10*6/UL (ref 4–5.2)
SODIUM SERPL-SCNC: 136 MMOL/L (ref 136–145)
T3 SERPL-MCNC: 195 NG/DL (ref 60–200)
T4 FREE SERPL-MCNC: 0.62 NG/DL (ref 0.61–1.12)
TREPONEMA PALLIDUM IGG+IGM AB [PRESENCE] IN SERUM OR PLASMA BY IMMUNOASSAY: NONREACTIVE
TRIGL SERPL-MCNC: 196 MG/DL (ref 0–149)
TSH SERPL-ACNC: 1.87 MIU/L (ref 0.44–3.98)
VLDL: 39 MG/DL (ref 0–40)
WBC # BLD AUTO: 11.5 X10*3/UL (ref 4.4–11.3)

## 2024-05-20 PROCEDURE — 84439 ASSAY OF FREE THYROXINE: CPT

## 2024-05-20 PROCEDURE — 80053 COMPREHEN METABOLIC PANEL: CPT

## 2024-05-20 PROCEDURE — 86780 TREPONEMA PALLIDUM: CPT

## 2024-05-20 PROCEDURE — 84443 ASSAY THYROID STIM HORMONE: CPT

## 2024-05-20 PROCEDURE — 87591 N.GONORRHOEAE DNA AMP PROB: CPT

## 2024-05-20 PROCEDURE — 36415 COLL VENOUS BLD VENIPUNCTURE: CPT

## 2024-05-20 PROCEDURE — 80061 LIPID PANEL: CPT

## 2024-05-20 PROCEDURE — 82306 VITAMIN D 25 HYDROXY: CPT

## 2024-05-20 PROCEDURE — 80307 DRUG TEST PRSMV CHEM ANLYZR: CPT

## 2024-05-20 PROCEDURE — 83036 HEMOGLOBIN GLYCOSYLATED A1C: CPT

## 2024-05-20 PROCEDURE — 87389 HIV-1 AG W/HIV-1&-2 AB AG IA: CPT

## 2024-05-20 PROCEDURE — 85025 COMPLETE CBC W/AUTO DIFF WBC: CPT

## 2024-05-20 PROCEDURE — 84480 ASSAY TRIIODOTHYRONINE (T3): CPT

## 2024-05-20 PROCEDURE — 81025 URINE PREGNANCY TEST: CPT

## 2024-05-20 PROCEDURE — 87491 CHLMYD TRACH DNA AMP PROBE: CPT

## 2024-05-20 PROCEDURE — 80074 ACUTE HEPATITIS PANEL: CPT

## 2024-05-20 PROCEDURE — 82248 BILIRUBIN DIRECT: CPT

## 2024-05-21 LAB
C TRACH RRNA SPEC QL NAA+PROBE: NEGATIVE
N GONORRHOEA DNA SPEC QL PROBE+SIG AMP: NEGATIVE

## 2024-05-24 ENCOUNTER — ROUTINE PRENATAL (OUTPATIENT)
Dept: OBSTETRICS AND GYNECOLOGY | Facility: CLINIC | Age: 24
End: 2024-05-24
Payer: MEDICAID

## 2024-05-24 VITALS — WEIGHT: 184.4 LBS | SYSTOLIC BLOOD PRESSURE: 104 MMHG | DIASTOLIC BLOOD PRESSURE: 66 MMHG | BODY MASS INDEX: 32.66 KG/M2

## 2024-05-24 DIAGNOSIS — Z3A.31 31 WEEKS GESTATION OF PREGNANCY (HHS-HCC): Primary | ICD-10-CM

## 2024-05-24 PROCEDURE — 99213 OFFICE O/P EST LOW 20 MIN: CPT | Performed by: OBSTETRICS & GYNECOLOGY

## 2024-05-24 NOTE — PROGRESS NOTES
Subjective   Patient ID [id number]   Mony Rea is a 24 y.o.  at 31w5d with a working estimated date of delivery of 2024, by Ultrasound who presents for a routine prenatal visit. She denies vaginal bleeding, leakage of fluid, decreased fetal movements, or contractions.    Chief Complaint   Patient presents with    Routine Prenatal Visit     Patient has no concerns at this time. Urine is negative for glucose and 30mg/dL of protein.          Objective   Physical Exam  Weight: 83.6 kg (184 lb 6.4 oz)  Expected Total Weight Gain: 5 kg (11 lb)-9 kg (19 lb)   Pregravid BMI: 31.54  BP: 104/66  Fetal Heart Rate: 132 Fundal Height (cm): 31 cm    Prenatal Labs    Lab Results   Component Value Date    HGB 11.4 (L) 2024    HCT 33.8 (L) 2024    ABO A 2024    HEPBSAG Nonreactive 2024       Assessment/Plan   Problem List Items Addressed This Visit    None  Visit Diagnoses       31 weeks gestation of pregnancy ([facility])    -  Primary            Continue prenatal vitamin.  Labs reviewed.    Follow up in 2 weeks for a routine prenatal visit.

## 2024-06-05 ENCOUNTER — LAB (OUTPATIENT)
Dept: LAB | Facility: LAB | Age: 24
End: 2024-06-05
Payer: MEDICAID

## 2024-06-05 DIAGNOSIS — Z11.3 ENCOUNTER FOR SCREENING FOR INFECTIONS WITH A PREDOMINANTLY SEXUAL MODE OF TRANSMISSION: ICD-10-CM

## 2024-06-05 DIAGNOSIS — Z11.59 ENCOUNTER FOR SCREENING FOR OTHER VIRAL DISEASES: ICD-10-CM

## 2024-06-05 DIAGNOSIS — F11.20 OPIOID DEPENDENCE, UNCOMPLICATED (MULTI): Primary | ICD-10-CM

## 2024-06-05 DIAGNOSIS — Z13.228 ENCOUNTER FOR SCREENING FOR OTHER METABOLIC DISORDERS: ICD-10-CM

## 2024-06-05 DIAGNOSIS — Z13.29 ENCOUNTER FOR SCREENING FOR OTHER SUSPECTED ENDOCRINE DISORDER: ICD-10-CM

## 2024-06-05 LAB
AMPHETAMINES UR QL SCN: NORMAL
BARBITURATES UR QL SCN: NORMAL
BENZODIAZ UR QL SCN: NORMAL
BZE UR QL SCN: NORMAL
CANNABINOIDS UR QL SCN: NORMAL
ETHANOL ?TM UR-MCNC: <10 MG/DL
FENTANYL+NORFENTANYL UR QL SCN: NORMAL
METHADONE UR QL SCN: NORMAL
OPIATES UR QL SCN: NORMAL
OXYCODONE+OXYMORPHONE UR QL SCN: NORMAL
PCP UR QL SCN: NORMAL

## 2024-06-05 PROCEDURE — 80307 DRUG TEST PRSMV CHEM ANLYZR: CPT

## 2024-06-05 PROCEDURE — 80355 GABAPENTIN NON-BLOOD: CPT

## 2024-06-07 ENCOUNTER — ROUTINE PRENATAL (OUTPATIENT)
Dept: OBSTETRICS AND GYNECOLOGY | Facility: CLINIC | Age: 24
End: 2024-06-07
Payer: MEDICAID

## 2024-06-07 VITALS — BODY MASS INDEX: 32.59 KG/M2 | WEIGHT: 184 LBS | DIASTOLIC BLOOD PRESSURE: 64 MMHG | SYSTOLIC BLOOD PRESSURE: 112 MMHG

## 2024-06-07 DIAGNOSIS — Z3A.33 33 WEEKS GESTATION OF PREGNANCY (HHS-HCC): Primary | ICD-10-CM

## 2024-06-07 LAB
BUPRENORPHINE SCREEN, URINE: NORMAL NG/ML
DRUG SCREEN COMMENT UR-IMP: NORMAL

## 2024-06-07 PROCEDURE — 99213 OFFICE O/P EST LOW 20 MIN: CPT | Performed by: OBSTETRICS & GYNECOLOGY

## 2024-06-07 NOTE — PROGRESS NOTES
Subjective   Patient ID [id number]   Mony Rea is a 24 y.o.  at 33w5d with a working estimated date of delivery of 2024, by Ultrasound who presents for a routine prenatal visit. She denies vaginal bleeding, leakage of fluid, decreased fetal movements, or contractions.    Chief Complaint   Patient presents with    Routine Prenatal Visit     Patient is here for a parental visit. Patient has no concerns. Patient denies any headaches, blurred vision, and extreme swelling.             Objective   Physical Exam  Weight: 83.5 kg (184 lb)  Expected Total Weight Gain: 5 kg (11 lb)-9 kg (19 lb)   Pregravid BMI: 31.54  BP: 112/64  Fetal Heart Rate: 142 Fundal Height (cm): 33 cm    Prenatal Labs    Lab Results   Component Value Date    HGB 11.4 (L) 2024    HCT 33.8 (L) 2024    ABO A 2024    HEPBSAG Nonreactive 2024       Assessment/Plan   Problem List Items Addressed This Visit    None  Visit Diagnoses       33 weeks gestation of pregnancy ([facility])    -  Primary            Continue prenatal vitamin.  Labs reviewed.      Follow up in 2 weeks for a routine prenatal visit.

## 2024-06-08 LAB — GABAPENTIN UR-MCNC: <5 UG/ML

## 2024-06-09 LAB
BUPRENORPHINE UR-MCNC: 159 NG/ML
BUPRENORPHINE UR-MCNC: <2 NG/ML
NALOXONE UR CFM-MCNC: <100 NG/ML
NORBUPRENORPHINE UR CFM-MCNC: 626 NG/ML
NORBUPRENORPHINE UR-MCNC: 99 NG/ML

## 2024-06-11 ENCOUNTER — LAB (OUTPATIENT)
Dept: LAB | Facility: LAB | Age: 24
End: 2024-06-11
Payer: MEDICAID

## 2024-06-11 DIAGNOSIS — F11.20 OPIOID DEPENDENCE, UNCOMPLICATED (MULTI): ICD-10-CM

## 2024-06-11 DIAGNOSIS — Z13.228 ENCOUNTER FOR SCREENING FOR OTHER METABOLIC DISORDERS: ICD-10-CM

## 2024-06-11 DIAGNOSIS — Z13.29 ENCOUNTER FOR SCREENING FOR OTHER SUSPECTED ENDOCRINE DISORDER: ICD-10-CM

## 2024-06-11 DIAGNOSIS — Z11.59 ENCOUNTER FOR SCREENING FOR OTHER VIRAL DISEASES: ICD-10-CM

## 2024-06-11 DIAGNOSIS — Z11.3 ENCOUNTER FOR SCREENING FOR INFECTIONS WITH A PREDOMINANTLY SEXUAL MODE OF TRANSMISSION: ICD-10-CM

## 2024-06-11 PROCEDURE — 80307 DRUG TEST PRSMV CHEM ANLYZR: CPT

## 2024-06-11 PROCEDURE — 80355 GABAPENTIN NON-BLOOD: CPT

## 2024-06-13 LAB
BUPRENORPHINE SCREEN, URINE: NORMAL NG/ML
DRUG SCREEN COMMENT UR-IMP: NORMAL

## 2024-06-14 LAB — GABAPENTIN UR-MCNC: <5 UG/ML

## 2024-06-16 LAB
BUPRENORPHINE UR-MCNC: 53 NG/ML
BUPRENORPHINE UR-MCNC: <2 NG/ML
NALOXONE UR CFM-MCNC: <100 NG/ML
NORBUPRENORPHINE UR CFM-MCNC: 199 NG/ML
NORBUPRENORPHINE UR-MCNC: 42 NG/ML

## 2024-06-20 ENCOUNTER — LAB (OUTPATIENT)
Dept: LAB | Facility: LAB | Age: 24
End: 2024-06-20
Payer: MEDICAID

## 2024-06-20 DIAGNOSIS — F11.20 OPIOID DEPENDENCE, UNCOMPLICATED (MULTI): ICD-10-CM

## 2024-06-20 DIAGNOSIS — Z11.3 ENCOUNTER FOR SCREENING FOR INFECTIONS WITH A PREDOMINANTLY SEXUAL MODE OF TRANSMISSION: ICD-10-CM

## 2024-06-20 DIAGNOSIS — Z13.29 ENCOUNTER FOR SCREENING FOR OTHER SUSPECTED ENDOCRINE DISORDER: ICD-10-CM

## 2024-06-20 DIAGNOSIS — Z13.228 ENCOUNTER FOR SCREENING FOR OTHER METABOLIC DISORDERS: ICD-10-CM

## 2024-06-20 DIAGNOSIS — Z11.59 ENCOUNTER FOR SCREENING FOR OTHER VIRAL DISEASES: ICD-10-CM

## 2024-06-20 PROCEDURE — 80307 DRUG TEST PRSMV CHEM ANLYZR: CPT

## 2024-06-20 PROCEDURE — 80355 GABAPENTIN NON-BLOOD: CPT

## 2024-06-21 ENCOUNTER — APPOINTMENT (OUTPATIENT)
Dept: OBSTETRICS AND GYNECOLOGY | Facility: CLINIC | Age: 24
End: 2024-06-21
Payer: MEDICAID

## 2024-06-21 VITALS — DIASTOLIC BLOOD PRESSURE: 68 MMHG | BODY MASS INDEX: 32.7 KG/M2 | WEIGHT: 184.6 LBS | SYSTOLIC BLOOD PRESSURE: 118 MMHG

## 2024-06-21 DIAGNOSIS — Z3A.35 35 WEEKS GESTATION OF PREGNANCY (HHS-HCC): ICD-10-CM

## 2024-06-21 PROCEDURE — 87081 CULTURE SCREEN ONLY: CPT

## 2024-06-21 PROCEDURE — 99213 OFFICE O/P EST LOW 20 MIN: CPT | Performed by: OBSTETRICS & GYNECOLOGY

## 2024-06-21 NOTE — PROGRESS NOTES
Subjective   Patient ID [id number]   Mony Rea is a 24 y.o.  at 35w5d with a working estimated date of delivery of 2024, by Ultrasound who presents for a routine prenatal visit. She denies vaginal bleeding, leakage of fluid, decreased fetal movements, or contractions.    Chief Complaint   Patient presents with    Routine Prenatal Visit     Patient is here for a parental visit. Patient has no concerns. Patient denies any headaches, blurred vision, and extreme swelling. Patient needs GBS today.             Objective   Physical Exam  Weight: 83.7 kg (184 lb 9.6 oz)  Expected Total Weight Gain: 5 kg (11 lb)-9 kg (19 lb)   Pregravid BMI: 31.54  BP: 118/68  Fetal Heart Rate: 148 Fundal Height (cm): 35 cm    Prenatal Labs    Lab Results   Component Value Date    HGB 11.4 (L) 2024    HCT 33.8 (L) 2024    ABO A 2024    HEPBSAG Nonreactive 2024       Assessment/Plan   Problem List Items Addressed This Visit    None  Visit Diagnoses       35 weeks gestation of pregnancy ([facility])        Relevant Orders    Group B Streptococcus (GBS) Prenatal Screen, Culture    US OB follow UP transabdominal approach            Continue prenatal vitamin.  Labs reviewed.    Group B strep culture obtained today.  Ultrasound in 2 weeks for growth.  Follow up in 1 week for a routine prenatal visit.

## 2024-06-22 LAB
BUPRENORPHINE SCREEN, URINE: NORMAL NG/ML
DRUG SCREEN COMMENT UR-IMP: NORMAL

## 2024-06-23 LAB — GP B STREP GENITAL QL CULT: NORMAL

## 2024-06-24 LAB
BUPRENORPHINE UR-MCNC: 168 NG/ML
BUPRENORPHINE UR-MCNC: <2 NG/ML
GABAPENTIN UR-MCNC: <5 UG/ML
GP B STREP GENITAL QL CULT: NORMAL
NALOXONE UR CFM-MCNC: <100 NG/ML
NORBUPRENORPHINE UR CFM-MCNC: 520 NG/ML
NORBUPRENORPHINE UR-MCNC: 121 NG/ML

## 2024-06-26 ENCOUNTER — LAB (OUTPATIENT)
Dept: LAB | Facility: LAB | Age: 24
End: 2024-06-26
Payer: MEDICAID

## 2024-06-26 DIAGNOSIS — Z11.3 ENCOUNTER FOR SCREENING FOR INFECTIONS WITH A PREDOMINANTLY SEXUAL MODE OF TRANSMISSION: ICD-10-CM

## 2024-06-26 DIAGNOSIS — Z13.29 ENCOUNTER FOR SCREENING FOR OTHER SUSPECTED ENDOCRINE DISORDER: ICD-10-CM

## 2024-06-26 DIAGNOSIS — F11.20 OPIOID DEPENDENCE, UNCOMPLICATED (MULTI): ICD-10-CM

## 2024-06-26 DIAGNOSIS — Z11.59 ENCOUNTER FOR SCREENING FOR OTHER VIRAL DISEASES: ICD-10-CM

## 2024-06-26 DIAGNOSIS — Z13.228 ENCOUNTER FOR SCREENING FOR OTHER METABOLIC DISORDERS: ICD-10-CM

## 2024-06-26 LAB
AMPHETAMINES UR QL SCN: NORMAL
BARBITURATES UR QL SCN: NORMAL
BENZODIAZ UR QL SCN: NORMAL
BZE UR QL SCN: NORMAL
CANNABINOIDS UR QL SCN: NORMAL
FENTANYL+NORFENTANYL UR QL SCN: NORMAL
METHADONE UR QL SCN: NORMAL
OPIATES UR QL SCN: NORMAL
OXYCODONE+OXYMORPHONE UR QL SCN: NORMAL
PCP UR QL SCN: NORMAL

## 2024-06-26 PROCEDURE — 80307 DRUG TEST PRSMV CHEM ANLYZR: CPT

## 2024-06-26 PROCEDURE — 80355 GABAPENTIN NON-BLOOD: CPT

## 2024-06-27 LAB — ETHANOL ?TM UR-MCNC: <10 MG/DL

## 2024-06-28 ENCOUNTER — APPOINTMENT (OUTPATIENT)
Dept: OBSTETRICS AND GYNECOLOGY | Facility: CLINIC | Age: 24
End: 2024-06-28
Payer: MEDICAID

## 2024-06-28 VITALS — WEIGHT: 186.2 LBS | SYSTOLIC BLOOD PRESSURE: 112 MMHG | DIASTOLIC BLOOD PRESSURE: 60 MMHG | BODY MASS INDEX: 32.98 KG/M2

## 2024-06-28 DIAGNOSIS — Z3A.36 36 WEEKS GESTATION OF PREGNANCY (HHS-HCC): Primary | ICD-10-CM

## 2024-06-28 LAB
BUPRENORPHINE SCREEN, URINE: NORMAL NG/ML
DRUG SCREEN COMMENT UR-IMP: NORMAL

## 2024-06-28 PROCEDURE — 99213 OFFICE O/P EST LOW 20 MIN: CPT | Performed by: OBSTETRICS & GYNECOLOGY

## 2024-06-28 NOTE — PROGRESS NOTES
Subjective   Patient ID [id number]   Mony Rea is a 24 y.o.  at 36w5d with a working estimated date of delivery of 2024, by Ultrasound who presents for a routine prenatal visit. She denies vaginal bleeding, leakage of fluid, decreased fetal movements, or contractions.    Chief Complaint   Patient presents with    Routine Prenatal Visit     Patient c/o pelvic pressure and lower back pain.          Objective   Physical Exam  Weight: 84.5 kg (186 lb 3.2 oz)  Expected Total Weight Gain: 5 kg (11 lb)-9 kg (19 lb)   Pregravid BMI: 31.54  BP: 112/60  Fetal Heart Rate: 144 Fundal Height (cm): 35 cm    Prenatal Labs    Lab Results   Component Value Date    HGB 11.4 (L) 2024    HCT 33.8 (L) 2024    ABO A 2024    HEPBSAG Nonreactive 2024       Assessment/Plan   Problem List Items Addressed This Visit    None      Continue prenatal vitamin.  Labs reviewed.    Follow up in 1 week for a routine prenatal visit.

## 2024-06-29 LAB — GABAPENTIN UR-MCNC: <5 UG/ML

## 2024-06-30 LAB
BUPRENORPHINE UR-MCNC: 64 NG/ML
BUPRENORPHINE UR-MCNC: <2 NG/ML
NALOXONE UR CFM-MCNC: <100 NG/ML
NORBUPRENORPHINE UR CFM-MCNC: 565 NG/ML
NORBUPRENORPHINE UR-MCNC: 62 NG/ML

## 2024-07-05 ENCOUNTER — APPOINTMENT (OUTPATIENT)
Dept: OBSTETRICS AND GYNECOLOGY | Facility: CLINIC | Age: 24
End: 2024-07-05
Payer: MEDICAID

## 2024-07-05 ENCOUNTER — HOSPITAL ENCOUNTER (OUTPATIENT)
Dept: RADIOLOGY | Facility: HOSPITAL | Age: 24
Discharge: HOME | End: 2024-07-05
Payer: MEDICAID

## 2024-07-05 VITALS — DIASTOLIC BLOOD PRESSURE: 64 MMHG | BODY MASS INDEX: 33.2 KG/M2 | SYSTOLIC BLOOD PRESSURE: 110 MMHG | WEIGHT: 187.4 LBS

## 2024-07-05 DIAGNOSIS — Z3A.35 35 WEEKS GESTATION OF PREGNANCY (HHS-HCC): ICD-10-CM

## 2024-07-05 DIAGNOSIS — Z3A.37 37 WEEKS GESTATION OF PREGNANCY (HHS-HCC): Primary | ICD-10-CM

## 2024-07-05 PROCEDURE — 76816 OB US FOLLOW-UP PER FETUS: CPT

## 2024-07-05 NOTE — PROGRESS NOTES
Subjective   Patient ID [id number]   Mony Rea is a 24 y.o.  at 37w5d with a working estimated date of delivery of 2024, by Ultrasound who presents for a routine prenatal visit. She denies vaginal bleeding, leakage of fluid, decreased fetal movements, or contractions.    Chief Complaint   Patient presents with    Routine Prenatal Visit     Patient c/o pelvic pressure.          Objective   Physical Exam  Weight: 85 kg (187 lb 6.4 oz)  Expected Total Weight Gain: 5 kg (11 lb)-9 kg (19 lb)   Pregravid BMI: 31.54  BP: 110/64  Fetal Heart Rate: 142 Fundal Height (cm): 36 cm    Prenatal Labs    Lab Results   Component Value Date    HGB 11.4 (L) 2024    HCT 33.8 (L) 2024    ABO A 2024    HEPBSAG Nonreactive 2024       Assessment/Plan   Problem List Items Addressed This Visit    None  Visit Diagnoses       37 weeks gestation of pregnancy ([facility])    -  Primary            Continue prenatal vitamin.  Labs reviewed.    Follow up in 1 week for a routine prenatal visit.

## 2024-07-09 ENCOUNTER — LAB (OUTPATIENT)
Dept: LAB | Facility: LAB | Age: 24
End: 2024-07-09
Payer: MEDICAID

## 2024-07-09 DIAGNOSIS — F11.20 OPIOID DEPENDENCE, UNCOMPLICATED (MULTI): ICD-10-CM

## 2024-07-09 DIAGNOSIS — Z11.59 ENCOUNTER FOR SCREENING FOR OTHER VIRAL DISEASES: ICD-10-CM

## 2024-07-09 DIAGNOSIS — Z11.3 ENCOUNTER FOR SCREENING FOR INFECTIONS WITH A PREDOMINANTLY SEXUAL MODE OF TRANSMISSION: ICD-10-CM

## 2024-07-09 DIAGNOSIS — Z13.228 ENCOUNTER FOR SCREENING FOR OTHER METABOLIC DISORDERS: ICD-10-CM

## 2024-07-09 DIAGNOSIS — Z13.29 ENCOUNTER FOR SCREENING FOR OTHER SUSPECTED ENDOCRINE DISORDER: ICD-10-CM

## 2024-07-09 PROCEDURE — 80355 GABAPENTIN NON-BLOOD: CPT

## 2024-07-09 PROCEDURE — 80307 DRUG TEST PRSMV CHEM ANLYZR: CPT

## 2024-07-11 LAB
BUPRENORPHINE SCREEN, URINE: NORMAL NG/ML
DRUG SCREEN COMMENT UR-IMP: NORMAL

## 2024-07-12 ENCOUNTER — APPOINTMENT (OUTPATIENT)
Dept: OBSTETRICS AND GYNECOLOGY | Facility: CLINIC | Age: 24
End: 2024-07-12
Payer: MEDICAID

## 2024-07-12 VITALS — BODY MASS INDEX: 32.77 KG/M2 | DIASTOLIC BLOOD PRESSURE: 64 MMHG | WEIGHT: 185 LBS | SYSTOLIC BLOOD PRESSURE: 114 MMHG

## 2024-07-12 DIAGNOSIS — Z3A.38 38 WEEKS GESTATION OF PREGNANCY (HHS-HCC): Primary | ICD-10-CM

## 2024-07-12 PROCEDURE — 99213 OFFICE O/P EST LOW 20 MIN: CPT | Performed by: OBSTETRICS & GYNECOLOGY

## 2024-07-12 NOTE — PROGRESS NOTES
Subjective   Patient ID [id number]   Mony Rea is a 24 y.o.  at 38w5d with a working estimated date of delivery of 2024, by Ultrasound who presents for a routine prenatal visit. She denies vaginal bleeding, leakage of fluid, decreased fetal movements, or contractions.    Chief Complaint   Patient presents with    Routine Prenatal Visit     Patient c/o cramps and weight loss. Patient had a 2 pound loss from last week.           Objective   Physical Exam  Weight: 83.9 kg (185 lb)  Expected Total Weight Gain: 5 kg (11 lb)-9 kg (19 lb)   Pregravid BMI: 31.54  BP: 114/64  Fetal Heart Rate: 144 Fundal Height (cm): 36 cm    Prenatal Labs    Lab Results   Component Value Date    HGB 11.4 (L) 2024    HCT 33.8 (L) 2024    ABO A 2024    HEPBSAG Nonreactive 2024       Assessment/Plan   Problem List Items Addressed This Visit    None  Visit Diagnoses       38 weeks gestation of pregnancy ([facility])    -  Primary            Continue prenatal vitamin.  Labs reviewed.    She wishes to hold off on scheduling induction until after her due date.  Follow up in 1 week for a routine prenatal visit.

## 2024-07-13 LAB — GABAPENTIN UR-MCNC: <5 UG/ML

## 2024-07-14 LAB
BUPRENORPHINE UR-MCNC: 116 NG/ML
BUPRENORPHINE UR-MCNC: <2 NG/ML
NALOXONE UR CFM-MCNC: <100 NG/ML
NORBUPRENORPHINE UR CFM-MCNC: 272 NG/ML
NORBUPRENORPHINE UR-MCNC: 153 NG/ML

## 2024-07-22 ENCOUNTER — APPOINTMENT (OUTPATIENT)
Dept: OBSTETRICS AND GYNECOLOGY | Facility: CLINIC | Age: 24
End: 2024-07-22
Payer: MEDICAID

## 2024-07-22 VITALS — DIASTOLIC BLOOD PRESSURE: 72 MMHG | BODY MASS INDEX: 33.37 KG/M2 | WEIGHT: 188.4 LBS | SYSTOLIC BLOOD PRESSURE: 118 MMHG

## 2024-07-22 DIAGNOSIS — Z3A.40 40 WEEKS GESTATION OF PREGNANCY (HHS-HCC): Primary | ICD-10-CM

## 2024-07-22 PROCEDURE — 99213 OFFICE O/P EST LOW 20 MIN: CPT | Performed by: OBSTETRICS & GYNECOLOGY

## 2024-07-22 NOTE — PROGRESS NOTES
Subjective   Patient ID [id number]   Mony Rea is a 24 y.o.  at 40w1d with a working estimated date of delivery of 2024, by Ultrasound who presents for a routine prenatal visit. She denies vaginal bleeding, leakage of fluid, decreased fetal movements, or contractions.    Chief Complaint   Patient presents with    Routine Prenatal Visit     Pt here today has slight cramping. Pt would like to set up an induction.           Objective   Physical Exam  Weight: 85.5 kg (188 lb 6.4 oz)  Expected Total Weight Gain: 5 kg (11 lb)-9 kg (19 lb)   Pregravid BMI: 31.54  BP: 118/72  Fetal Heart Rate: 138 Fundal Height (cm): 37 cm    Prenatal Labs    Lab Results   Component Value Date    HGB 11.4 (L) 2024    HCT 33.8 (L) 2024    ABO A 2024    HEPBSAG Nonreactive 2024       Assessment/Plan   Problem List Items Addressed This Visit    None  Visit Diagnoses       40 weeks gestation of pregnancy ([facility])    -  Primary            Continue prenatal vitamin.  Labs reviewed.    Plan for induction tomorrow.  Follow up in 4 weeks for postpartum visit.

## 2024-08-04 ENCOUNTER — HOSPITAL ENCOUNTER (EMERGENCY)
Facility: HOSPITAL | Age: 24
Discharge: HOME | End: 2024-08-04
Payer: MEDICAID

## 2024-08-04 VITALS
RESPIRATION RATE: 20 BRPM | WEIGHT: 175 LBS | OXYGEN SATURATION: 94 % | TEMPERATURE: 96.5 F | SYSTOLIC BLOOD PRESSURE: 130 MMHG | HEIGHT: 63 IN | HEART RATE: 85 BPM | DIASTOLIC BLOOD PRESSURE: 86 MMHG | BODY MASS INDEX: 31.01 KG/M2

## 2024-08-04 DIAGNOSIS — L03.311 CELLULITIS, ABDOMINAL WALL: Primary | ICD-10-CM

## 2024-08-04 PROCEDURE — 99283 EMERGENCY DEPT VISIT LOW MDM: CPT

## 2024-08-04 RX ORDER — CEPHALEXIN 500 MG/1
500 CAPSULE ORAL 4 TIMES DAILY
Qty: 40 CAPSULE | Refills: 0 | Status: SHIPPED | OUTPATIENT
Start: 2024-08-04 | End: 2024-08-14

## 2024-08-04 RX ORDER — MUPIROCIN CALCIUM 20 MG/G
1 CREAM TOPICAL 3 TIMES DAILY
Qty: 3 G | Refills: 0 | Status: SHIPPED | OUTPATIENT
Start: 2024-08-04 | End: 2024-08-14

## 2024-08-04 ASSESSMENT — COLUMBIA-SUICIDE SEVERITY RATING SCALE - C-SSRS
1. IN THE PAST MONTH, HAVE YOU WISHED YOU WERE DEAD OR WISHED YOU COULD GO TO SLEEP AND NOT WAKE UP?: NO
6. HAVE YOU EVER DONE ANYTHING, STARTED TO DO ANYTHING, OR PREPARED TO DO ANYTHING TO END YOUR LIFE?: NO
2. HAVE YOU ACTUALLY HAD ANY THOUGHTS OF KILLING YOURSELF?: NO

## 2024-08-04 ASSESSMENT — PAIN SCALES - GENERAL: PAINLEVEL_OUTOF10: 0 - NO PAIN

## 2024-08-04 ASSESSMENT — PAIN - FUNCTIONAL ASSESSMENT: PAIN_FUNCTIONAL_ASSESSMENT: 0-10

## 2024-08-04 NOTE — ED PROVIDER NOTES
Chief Complaint   Patient presents with    Wound Infection     Pt is 11 days post op from c sections. Pt is concerned for infection, noted redness. Denies fever, chills, nausea or drainage        Patient History    No past medical history on file.   No past surgical history on file.   Family History   Problem Relation Name Age of Onset    No Known Problems Mother      No Known Problems Father      Breast cancer Maternal Grandmother        Social History     Social History Narrative    Not on file      No Known Allergies     PMH: Reviewed  PSH: Reviewed  Social History: Reviewed.   Allergies reviewed.     HPI: Mony Rea is a 24 y.o. female who presents to the ED today accompanied by her  and baby with concerns for surgical incision infection. She's 11 days post  and noted redness and increased tenderness along the left side of her incision in the past 1-2 days. No fevers or chills.  She has not noted any drainage from the incision.  No nausea or vomiting.  No overall ill feeling.  She is currently breast-feeding.  States she has been eating and drinking well.  Having normal elimination patterns.    PHYSICAL EXAM:    GENERAL: Vitals noted, no distress. Alert and oriented x 3. Non-toxic.       HEAD: Normocephalic, atraumatic. Pupils equally round and reactive to light. EOMI. TMs clear, no hemotympanum, Torres's sign, or raccoon eyes.    NECK: Supple. No midline or paraspinal tenderness through full range of motion.      CARDIAC: Regular rate, rhythm. No murmurs or rubs.    RESPIRATORY: Lungs clear and equal bilaterally. No respiratory distress.     ABDOMEN:  incision site with Steri-Strips in place, mild erythema noted on the left half of the incision, slightly increased tenderness to palpation comparatively to the right side of the incision.  No drainage.  No significant induration noted.    MUSCULOSKELETAL & SKIN:  Warm, dry, and intact. No rash/lesions. No peripheral edema.     NEURO:  No focal neurologic deficits, acting appropriately.     Labs Reviewed - No data to display     No orders to display        Medical Decision Making         ED COURSE: This patient was seen and examined by myself independently.  Patient be started on Bactroban ointment as well as Keflex orally, which is rated L1 for breast-feeding.  Advised to follow-up with her OB/GYN at her scheduled appointment, return to the ER if her symptoms worsen in any way.  She verbalized understanding.  She is discharged home in stable condition with computer instructions given.      DIAGNOSTIC IMPRESSION: #1 cellulitis of surgical incision site     Mely More, CHRIS-CNP  08/04/24 1529

## 2024-08-26 ENCOUNTER — APPOINTMENT (OUTPATIENT)
Dept: OBSTETRICS AND GYNECOLOGY | Facility: CLINIC | Age: 24
End: 2024-08-26
Payer: MEDICAID

## 2024-08-26 VITALS
DIASTOLIC BLOOD PRESSURE: 62 MMHG | HEIGHT: 63 IN | SYSTOLIC BLOOD PRESSURE: 116 MMHG | WEIGHT: 173.2 LBS | BODY MASS INDEX: 30.69 KG/M2

## 2024-08-26 DIAGNOSIS — L03.311 CELLULITIS, ABDOMINAL WALL: Primary | ICD-10-CM

## 2024-08-26 ASSESSMENT — EDINBURGH POSTNATAL DEPRESSION SCALE (EPDS)
I HAVE FELT SAD OR MISERABLE: NO, NOT AT ALL
I HAVE BEEN ANXIOUS OR WORRIED FOR NO GOOD REASON: NO, NOT AT ALL
I HAVE BEEN SO UNHAPPY THAT I HAVE HAD DIFFICULTY SLEEPING: NOT AT ALL
THINGS HAVE BEEN GETTING ON TOP OF ME: NO, I HAVE BEEN COPING AS WELL AS EVER
THE THOUGHT OF HARMING MYSELF HAS OCCURRED TO ME: NEVER
I HAVE BLAMED MYSELF UNNECESSARILY WHEN THINGS WENT WRONG: NO, NEVER
I HAVE BEEN SO UNHAPPY THAT I HAVE BEEN CRYING: NO, NEVER
I HAVE LOOKED FORWARD WITH ENJOYMENT TO THINGS: AS MUCH AS I EVER DID
I HAVE FELT SCARED OR PANICKY FOR NO GOOD REASON: NO, NOT AT ALL
TOTAL SCORE: 0
I HAVE BEEN ABLE TO LAUGH AND SEE THE FUNNY SIDE OF THINGS: AS MUCH AS I ALWAYS COULD

## 2024-08-26 ASSESSMENT — PATIENT HEALTH QUESTIONNAIRE - PHQ9
2. FEELING DOWN, DEPRESSED OR HOPELESS: NOT AT ALL
SUM OF ALL RESPONSES TO PHQ9 QUESTIONS 1 AND 2: 0
1. LITTLE INTEREST OR PLEASURE IN DOING THINGS: NOT AT ALL

## 2024-08-26 ASSESSMENT — PAIN SCALES - GENERAL: PAINLEVEL: 0-NO PAIN

## 2024-08-26 NOTE — PROGRESS NOTES
Mony Rea is a 24 y.o. year old female patient.  PCP = No Assigned PCP Generic Provider, MD    Chief Complaint   Patient presents with    Postpartum Care     Patient is here for her 4 week post partum visit. Patient is breastfeeding. Patient does not wish to discuss birth control options at this time and states she has not resumed sexual activity. Patient denies any post partum depression and has no concerns at this time.        HPI   Presents for postpartum checkup. She voices no complaints and is doing well. Denies any bowel or bladder problems. Denies any breast problems.  She is breast-feeding and will be using condoms for contraception.  She had a  delivery.    OB History as of 2024          1    Para        Term                AB        Living             SAB        IAB        Ectopic        Multiple        Live Births                     History reviewed. No pertinent past medical history.    History reviewed. No pertinent surgical history.    Review of Systems:   Constitutional: No fever or chills  Respiratory: No shortness of breath, or cough  Cardiovascular: No chest pain or syncope  Breasts: No breast pain, no masses, no nipple discharge  Gastrointestinal: No nausea, vomiting, or diarrhea, no abdominal pain  Genitourinary: No dysuria or frequency  Gynecology: Negative except as noted in history of present illness  All other: All other systems reviewed and negative for complaint    Medication Documentation Review Audit       Reviewed by Kalin Connors MD (Physician) on 24 at 0943      Medication Order Taking? Sig Documenting Provider Last Dose Status   buprenorphine (Subtex) 8 mg [id number]  Place 1 tablet (8 mg) under the tongue once daily. Historical Provider, MD  Active   metoclopramide (Reglan) 5 mg tablet [id number]  Take 1 tablet (5 mg) by mouth 3 times a day as needed. Historical Provider, MD   01/10/24 1950   prenatal vitamin calcium-iron-folic 27 mg  "iron- 1 mg tablet [id number]  Take 1 tablet by mouth once daily. Historical Provider, MD  Active                     /62   Ht 1.6 m (5' 3\")   Wt 78.6 kg (173 lb 3.2 oz)   LMP 09/24/2023   Breastfeeding Yes   BMI 30.68 kg/m²     PHYSICAL EXAMINATION:  Chaperone present for exam:  Kellen Zimmerman, LPN  Well-developed, well nourished, in no acute distress, alert and oriented x three, is pleasant and cooperative.   HEENT: Clear. Pupils equal, round and reactive to light and accommodation. Extraocular muscles are intact. Oral mucosa pink without exudate.   NECK: No lymphadenopathy, no thyromegaly.  LUNGS: Clear bilaterally.  HEART: Regular rate and rhythm without murmurs.  ABDOMEN: Normoactive bowel sounds, soft and nontender, no guarding or rebound tenderness, no CVA tenderness.  Low Pfannenstiel incisions healing well, well-approximated, no erythema or drainage.  EXTREMITIES: No clubbing, cyanosis or edema.  NEUROLOGIC:  Cranial nerves II-XII grossly intact.  :  Normal external female genitalia, normal vulva, normal vagina. Normal urethral meatus, urethra and bladder. Normal appearing cervix. Normal-sized uterus, no adnexal masses or tenderness.    Lab Results   Component Value Date    WBC 11.5 (H) 05/20/2024    HGB 11.4 (L) 05/20/2024    HCT 33.8 (L) 05/20/2024     05/20/2024    CHOL 174 05/20/2024    TRIG 196 (H) 05/20/2024    HDL 57.0 05/20/2024    ALT 10 05/20/2024    AST 14 05/20/2024     05/20/2024    K 4.1 05/20/2024     05/20/2024    CREATININE 0.60 05/20/2024    BUN 9 05/20/2024    CO2 24 05/20/2024    TSH 1.87 05/20/2024    HGBA1C 4.9 05/20/2024          Problem List Items Addressed This Visit    None  Visit Diagnoses       Routine postpartum follow-up ([facility] located within [facility])                 Provider Impression:  1.  Postpartum checkup    Thank you for coming to your postpartum checkup. Your findings during the exam were normal.  Please return for your next visit in the next month for " colposcopy..

## 2024-09-03 ENCOUNTER — LAB (OUTPATIENT)
Dept: LAB | Facility: LAB | Age: 24
End: 2024-09-03
Payer: MEDICAID

## 2024-09-03 DIAGNOSIS — Z13.29 ENCOUNTER FOR SCREENING FOR OTHER SUSPECTED ENDOCRINE DISORDER: ICD-10-CM

## 2024-09-03 DIAGNOSIS — Z11.3 ENCOUNTER FOR SCREENING FOR INFECTIONS WITH A PREDOMINANTLY SEXUAL MODE OF TRANSMISSION: ICD-10-CM

## 2024-09-03 DIAGNOSIS — Z11.59 ENCOUNTER FOR SCREENING FOR OTHER VIRAL DISEASES: ICD-10-CM

## 2024-09-03 DIAGNOSIS — F11.20 OPIOID DEPENDENCE, UNCOMPLICATED (MULTI): ICD-10-CM

## 2024-09-03 DIAGNOSIS — Z13.228 ENCOUNTER FOR SCREENING FOR OTHER METABOLIC DISORDERS: ICD-10-CM

## 2024-09-03 PROCEDURE — 80307 DRUG TEST PRSMV CHEM ANLYZR: CPT

## 2024-09-03 PROCEDURE — 80355 GABAPENTIN NON-BLOOD: CPT

## 2024-09-04 LAB — ETHANOL ?TM UR-MCNC: <10 MG/DL

## 2024-09-05 LAB
BUPRENORPHINE SCREEN, URINE: NORMAL NG/ML
DRUG SCREEN COMMENT UR-IMP: NORMAL

## 2024-09-06 LAB — GABAPENTIN UR-MCNC: <5 UG/ML

## 2024-09-08 LAB
BUPRENORPHINE UR-MCNC: 123 NG/ML
BUPRENORPHINE UR-MCNC: 5 NG/ML
NALOXONE UR CFM-MCNC: <100 NG/ML
NORBUPRENORPHINE UR CFM-MCNC: 637 NG/ML
NORBUPRENORPHINE UR-MCNC: 176 NG/ML

## 2024-09-18 ENCOUNTER — APPOINTMENT (OUTPATIENT)
Dept: OBSTETRICS AND GYNECOLOGY | Facility: CLINIC | Age: 24
End: 2024-09-18
Payer: MEDICAID

## 2024-09-18 VITALS
HEIGHT: 63 IN | DIASTOLIC BLOOD PRESSURE: 62 MMHG | BODY MASS INDEX: 30.09 KG/M2 | WEIGHT: 169.8 LBS | SYSTOLIC BLOOD PRESSURE: 108 MMHG

## 2024-09-18 DIAGNOSIS — R87.612 LGSIL ON PAP SMEAR OF CERVIX: ICD-10-CM

## 2024-09-18 PROCEDURE — 88305 TISSUE EXAM BY PATHOLOGIST: CPT | Performed by: STUDENT IN AN ORGANIZED HEALTH CARE EDUCATION/TRAINING PROGRAM

## 2024-09-18 PROCEDURE — 88305 TISSUE EXAM BY PATHOLOGIST: CPT

## 2024-09-18 PROCEDURE — 57454 BX/CURETT OF CERVIX W/SCOPE: CPT | Performed by: OBSTETRICS & GYNECOLOGY

## 2024-09-18 ASSESSMENT — PAIN SCALES - GENERAL: PAINLEVEL: 0-NO PAIN

## 2024-09-18 NOTE — PROGRESS NOTES
"Patient ID: Mony Rea is a 24 y.o. female.  Chief Complaint   Patient presents with    Colposcopy     Patient is here for colp procedure. Last pap showed LGSIL.        /62   Ht 1.6 m (5' 3\")   Wt 77 kg (169 lb 12.8 oz)   Breastfeeding Yes   BMI 30.08 kg/m²      Colposcopy    Date/Time: 9/18/2024 11:39 AM    Performed by: Kalin Connors MD  Authorized by: Kalin Connors MD    Procedure location: cervix    Consent:     Patient questions answered: yes      Consent obtained:  Verbal    Consent given by:  Patient  Indication:     Cervical indication(s): RAYSHAWN 1    Pre-procedure:     Speculum was placed in the vagina: yes    Procedure:     Colposcopy with: cervical biopsy and endocervical curettage      Biopsy taken: yes      # of biopsies:  3    Biopsy location(s): 10:00, 2:00, ECC    Cervix visibility: fully visualized      SCJ visibility: fully visualized      Lesion visualized: fully visualized    Post-procedure:     Patient tolerance of procedure:  Patient tolerated the procedure well with no immediate complications  Comments:      Follow-up in 2 weeks to review cervical biopsy results.      Chaperone present for exam: Kellen Zimmerman LPN  OBGymery Exam              Problem List Items Addressed This Visit    None  Visit Diagnoses       LGSIL on Pap smear of cervix        Relevant Orders    Surgical Pathology Exam          "

## 2024-09-27 LAB
LABORATORY COMMENT REPORT: NORMAL
PATH REPORT.FINAL DX SPEC: NORMAL
PATH REPORT.GROSS SPEC: NORMAL
PATH REPORT.RELEVANT HX SPEC: NORMAL
PATH REPORT.TOTAL CANCER: NORMAL

## 2024-10-09 ENCOUNTER — APPOINTMENT (OUTPATIENT)
Dept: OBSTETRICS AND GYNECOLOGY | Facility: CLINIC | Age: 24
End: 2024-10-09
Payer: MEDICAID

## 2024-10-09 VITALS
HEIGHT: 63 IN | SYSTOLIC BLOOD PRESSURE: 124 MMHG | DIASTOLIC BLOOD PRESSURE: 68 MMHG | BODY MASS INDEX: 30.55 KG/M2 | WEIGHT: 172.4 LBS

## 2024-10-09 DIAGNOSIS — R87.612 LGSIL ON PAP SMEAR OF CERVIX: Primary | ICD-10-CM

## 2024-10-09 PROCEDURE — 3008F BODY MASS INDEX DOCD: CPT | Performed by: OBSTETRICS & GYNECOLOGY

## 2024-10-09 PROCEDURE — 1036F TOBACCO NON-USER: CPT | Performed by: OBSTETRICS & GYNECOLOGY

## 2024-10-09 PROCEDURE — 99213 OFFICE O/P EST LOW 20 MIN: CPT | Performed by: OBSTETRICS & GYNECOLOGY

## 2024-10-09 ASSESSMENT — PAIN SCALES - GENERAL: PAINLEVEL: 0-NO PAIN

## 2024-10-09 NOTE — PROGRESS NOTES
"  Mony Rea is a 24 y.o. year old female patient.  PCP = No Assigned PCP Generic Provider, MD    Chief Complaint   Patient presents with    Results     Patient is here to review colp results.       HPI   Presents to review the cervical biopsy since last visit.  She voices no complaints and is doing well.    OB History          1    Para   1    Term   1            AB        Living   1         SAB        IAB        Ectopic        Multiple        Live Births   1                 History reviewed. No pertinent past medical history.    Past Surgical History:   Procedure Laterality Date    COLPOSCOPY  2024    LGSIL       Review of Systems:   Constitutional: No fever or chills  Respiratory: No shortness of breath, or cough  Cardiovascular: No chest pain or syncope  Breasts: No breast pain, no masses, no nipple discharge  Gastrointestinal: No nausea, vomiting, or diarrhea, no abdominal pain  Genitourinary: No dysuria or frequency  Gynecology: Negative except as noted in history of present illness  All other: All other systems reviewed and negative for complaint    Medication Documentation Review Audit       Reviewed by Kalin Connors MD (Physician) on 10/09/24 at 1148      Medication Order Taking? Sig Documenting Provider Last Dose Status   buprenorphine (Subtex) 8 mg [id number]  Place 1 tablet (8 mg) under the tongue once daily. Historical Provider, MD  Active   metoclopramide (Reglan) 5 mg tablet [id number]  Take 1 tablet (5 mg) by mouth 3 times a day as needed. Historical Provider, MD   01/10/24 2359   prenatal vitamin calcium-iron-folic 27 mg iron- 1 mg tablet [id number]  Take 1 tablet by mouth once daily. Historical Provider, MD  Active                     /68   Ht 1.6 m (5' 3\")   Wt 78.2 kg (172 lb 6.4 oz)   Breastfeeding Yes   BMI 30.54 kg/m²     PHYSICAL EXAMINATION:  General: No acute distress  Eye: Intraocular movements are intact  HEENT: Normocephalic  Respiratory: " Respirations are nonlabored  Gastrointestinal: Nondistended   Musculoskeletal: Normal range of motion  Neurologic: Alert and oriented x3  Psychiatric: Cooperative, appropriate mood and affect.    Case Report   Surgical Pathology                                Case: [id number]                                   Authorizing Provider:  Kalin Connors MD         Collected:           09/18/2024 1142               Ordering Location:     [facility]       Received:            09/18/2024 1142                                      Office Building                                                               Pathologist:           Naina Mota MD                                                             Specimens:   A) - CERVIX BIOPSY, cervical biopsy at 2 o'clock                                                    B) - CERVIX BIOPSY, cervical biopsy at 10 o'clock                                                    C) - ENDOCERVIX CURETTINGS, ECC                                                                  FINAL DIAGNOSIS   A. Cervix, 2:00, biopsy:  -- Benign cervical transformation zone with chronic inflammation.     B. Cervix, 10:00, biopsy:  -- Low-grade squamous intraepithelial lesion (LSIL/RAYSHAWN-1).     C. Endocervix, curettage:  -- Minute fragment of benign endocervical glands.   Electronically signed by Naina Mtoa MD on 9/27/2024 at 1146      By the signature on this report, the individual or group listed as making the Final Interpretation/Diagnosis certifies that they have reviewed this case.        Problem List Items Addressed This Visit    None  Visit Diagnoses       LGSIL on Pap smear of cervix    -  Primary             Provider Impression:  1.  Mild dysplasia of the cervix  Personal review of the path report which shows mild dysplasia.  Patient informed that approximately 40 to 60% of individuals will have regression of the mild dysplasia over time.  Recommend close surveillance with Pap smear  every 6 months for several years.

## 2024-10-18 ENCOUNTER — LAB (OUTPATIENT)
Dept: LAB | Facility: LAB | Age: 24
End: 2024-10-18
Payer: MEDICAID

## 2024-10-18 DIAGNOSIS — Z11.3 ENCOUNTER FOR SCREENING FOR INFECTIONS WITH A PREDOMINANTLY SEXUAL MODE OF TRANSMISSION: ICD-10-CM

## 2024-10-18 DIAGNOSIS — Z11.59 ENCOUNTER FOR SCREENING FOR OTHER VIRAL DISEASES: ICD-10-CM

## 2024-10-18 DIAGNOSIS — Z13.228 ENCOUNTER FOR SCREENING FOR OTHER METABOLIC DISORDERS: ICD-10-CM

## 2024-10-18 DIAGNOSIS — F11.20 OPIOID DEPENDENCE, UNCOMPLICATED (MULTI): ICD-10-CM

## 2024-10-18 DIAGNOSIS — Z13.29 ENCOUNTER FOR SCREENING FOR OTHER SUSPECTED ENDOCRINE DISORDER: ICD-10-CM

## 2024-10-18 PROCEDURE — 80307 DRUG TEST PRSMV CHEM ANLYZR: CPT

## 2024-10-18 PROCEDURE — 80355 GABAPENTIN NON-BLOOD: CPT

## 2024-10-21 LAB
BUPRENORPHINE SCREEN, URINE: NORMAL NG/ML
DRUG SCREEN COMMENT UR-IMP: NORMAL

## 2024-10-23 LAB — GABAPENTIN UR-MCNC: <5 UG/ML

## 2024-10-24 LAB
BUPRENORPHINE UR-MCNC: 16 NG/ML
BUPRENORPHINE UR-MCNC: >1000 NG/ML
NALOXONE UR CFM-MCNC: <100 NG/ML
NORBUPRENORPHINE UR CFM-MCNC: >1000 NG/ML
NORBUPRENORPHINE UR-MCNC: 553 NG/ML

## 2024-11-29 ENCOUNTER — HOSPITAL ENCOUNTER (EMERGENCY)
Facility: HOSPITAL | Age: 24
Discharge: HOME | End: 2024-11-29
Attending: EMERGENCY MEDICINE
Payer: MEDICAID

## 2024-11-29 VITALS
SYSTOLIC BLOOD PRESSURE: 129 MMHG | BODY MASS INDEX: 31.01 KG/M2 | OXYGEN SATURATION: 96 % | WEIGHT: 175 LBS | HEART RATE: 87 BPM | HEIGHT: 63 IN | TEMPERATURE: 97.9 F | DIASTOLIC BLOOD PRESSURE: 87 MMHG | RESPIRATION RATE: 16 BRPM

## 2024-11-29 DIAGNOSIS — H60.11 CELLULITIS OF RIGHT PINNA: Primary | ICD-10-CM

## 2024-11-29 PROCEDURE — 99283 EMERGENCY DEPT VISIT LOW MDM: CPT

## 2024-11-29 RX ORDER — CEPHALEXIN 500 MG/1
500 CAPSULE ORAL 4 TIMES DAILY
Qty: 28 CAPSULE | Refills: 0 | Status: SHIPPED | OUTPATIENT
Start: 2024-11-29 | End: 2024-12-06

## 2024-11-29 ASSESSMENT — PAIN - FUNCTIONAL ASSESSMENT: PAIN_FUNCTIONAL_ASSESSMENT: 0-10

## 2024-11-29 ASSESSMENT — PAIN DESCRIPTION - ORIENTATION: ORIENTATION: RIGHT

## 2024-11-29 ASSESSMENT — PAIN DESCRIPTION - DESCRIPTORS: DESCRIPTORS: ACHING

## 2024-11-29 ASSESSMENT — PAIN DESCRIPTION - LOCATION: LOCATION: EAR

## 2024-11-29 ASSESSMENT — PAIN SCALES - GENERAL: PAINLEVEL_OUTOF10: 6

## 2024-11-29 ASSESSMENT — PAIN DESCRIPTION - PAIN TYPE: TYPE: ACUTE PAIN

## 2024-11-29 NOTE — ED PROVIDER NOTES
HPI   Chief Complaint   Patient presents with    Earache     Pt had her ear pierced on Monday, noticed redness and swelling on Tuesday, progressively getting worse. Small amt of drainage today       Limitations to History: None    HPI: 24-year-old female presents with concern for pain and redness to her right ear.  States that she had a pierced 4 days ago.  Began becoming red and draining.  Increasing pain.  Denies any change in hearing.  Denies any fall or trauma.    Additional History Obtained from: Significant other at the bedside.    ------------------------------------------------------------------------------------------------------------------------------------------  Physical Exam:    VS: As documented in the triage note and EMR flowsheet from this visit were reviewed.    Appearance: Alert. cooperative,  in no acute distress.   Skin: Erythema to the outer right ear.  Small scab overlying piercing.  No fluctuance.  HENT: Normocephalic, atraumatic. Nares patent. No intraoral lesions.  Right TM clear.  Neck: Supple, without meningismus. Trachea at midline. No lymphadenopathy.  Psychiatric: Appropriate mood and affect.                Patient History   No past medical history on file.  Past Surgical History:   Procedure Laterality Date    COLPOSCOPY  09/18/2024    LGSIL     Family History   Problem Relation Name Age of Onset    No Known Problems Mother      No Known Problems Father      Breast cancer Maternal Grandmother       Social History     Tobacco Use    Smoking status: Former     Current packs/day: 1.50     Types: Cigarettes    Smokeless tobacco: Never   Vaping Use    Vaping status: Every Day    Substances: Nicotine   Substance Use Topics    Alcohol use: Not on file    Drug use: Not Currently     Types: Heroin, Methamphetamines, Marijuana       Physical Exam   ED Triage Vitals [11/29/24 1633]   Temperature Heart Rate Respirations BP   36.6 °C (97.9 °F) 87 16 129/87      Pulse Ox Temp Source Heart Rate  Source Patient Position   96 % Temporal Monitor --      BP Location FiO2 (%)     -- --       Physical Exam      ED Course & MDM   Diagnoses as of 11/29/24 1636   Cellulitis of right pinna                 No data recorded                                 Medical Decision Making  Medical Decision Making:    Patient appears well nontoxic.  No fluctuance.  Patient will be treated with oral Keflex as an outpatient.  Advised to keep clean with soap and water.  Advised to follow-up with primary care and return for new or worsening symptoms.  Stable at time of discharge.    Differential Diagnoses Considered: Cellulitis, abscess    Escalation of Care:  Appropriate for discharge and follow-up with primary care.    Prescription Drug Consideration: Oral Keflex.          Procedure  Procedures     Mars Washington DO  11/29/24 1640

## 2024-12-30 ENCOUNTER — LAB (OUTPATIENT)
Dept: LAB | Facility: LAB | Age: 24
End: 2024-12-30
Payer: MEDICAID

## 2024-12-30 DIAGNOSIS — Z11.3 ENCOUNTER FOR SCREENING FOR INFECTIONS WITH A PREDOMINANTLY SEXUAL MODE OF TRANSMISSION: ICD-10-CM

## 2024-12-30 DIAGNOSIS — F11.20 OPIOID DEPENDENCE, UNCOMPLICATED (MULTI): ICD-10-CM

## 2024-12-30 DIAGNOSIS — Z13.228 ENCOUNTER FOR SCREENING FOR OTHER METABOLIC DISORDERS: ICD-10-CM

## 2024-12-30 DIAGNOSIS — Z13.29 ENCOUNTER FOR SCREENING FOR OTHER SUSPECTED ENDOCRINE DISORDER: ICD-10-CM

## 2024-12-30 DIAGNOSIS — Z11.59 ENCOUNTER FOR SCREENING FOR OTHER VIRAL DISEASES: ICD-10-CM

## 2024-12-30 PROCEDURE — 80355 GABAPENTIN NON-BLOOD: CPT

## 2024-12-30 PROCEDURE — 80307 DRUG TEST PRSMV CHEM ANLYZR: CPT

## 2025-01-01 LAB
BUPRENORPHINE SCREEN, URINE: NORMAL NG/ML
DRUG SCREEN COMMENT UR-IMP: NORMAL

## 2025-01-02 LAB — GABAPENTIN UR-MCNC: 54.9 UG/ML

## 2025-01-03 LAB
BUPRENORPHINE UR-MCNC: 2 NG/ML
BUPRENORPHINE UR-MCNC: 80 NG/ML
NALOXONE UR CFM-MCNC: <100 NG/ML
NORBUPRENORPHINE UR CFM-MCNC: 56 NG/ML
NORBUPRENORPHINE UR-MCNC: 411 NG/ML

## 2025-04-15 ENCOUNTER — APPOINTMENT (OUTPATIENT)
Dept: OBSTETRICS AND GYNECOLOGY | Facility: CLINIC | Age: 25
End: 2025-04-15
Payer: MEDICAID

## 2025-04-15 VITALS
DIASTOLIC BLOOD PRESSURE: 72 MMHG | HEIGHT: 63 IN | BODY MASS INDEX: 30.56 KG/M2 | WEIGHT: 172.5 LBS | SYSTOLIC BLOOD PRESSURE: 100 MMHG

## 2025-04-15 DIAGNOSIS — R87.612 LGSIL ON PAP SMEAR OF CERVIX: ICD-10-CM

## 2025-04-15 PROCEDURE — 1036F TOBACCO NON-USER: CPT | Performed by: OBSTETRICS & GYNECOLOGY

## 2025-04-15 PROCEDURE — 88175 CYTOPATH C/V AUTO FLUID REDO: CPT

## 2025-04-15 PROCEDURE — 3008F BODY MASS INDEX DOCD: CPT | Performed by: OBSTETRICS & GYNECOLOGY

## 2025-04-15 PROCEDURE — 99213 OFFICE O/P EST LOW 20 MIN: CPT | Performed by: OBSTETRICS & GYNECOLOGY

## 2025-04-15 PROCEDURE — 99459 PELVIC EXAMINATION: CPT | Performed by: OBSTETRICS & GYNECOLOGY

## 2025-04-15 RX ORDER — HYDROXYZINE PAMOATE 25 MG/1
1 CAPSULE ORAL
COMMUNITY
Start: 2025-03-24

## 2025-04-15 RX ORDER — ATOMOXETINE 40 MG/1
1 CAPSULE ORAL
COMMUNITY
Start: 2025-03-24

## 2025-04-15 RX ORDER — VENLAFAXINE HYDROCHLORIDE 37.5 MG/1
1 CAPSULE, EXTENDED RELEASE ORAL
COMMUNITY
Start: 2025-03-24

## 2025-04-15 RX ORDER — LISDEXAMFETAMINE DIMESYLATE 30 MG/1
1 CAPSULE ORAL
COMMUNITY
Start: 2025-04-11

## 2025-04-15 ASSESSMENT — PATIENT HEALTH QUESTIONNAIRE - PHQ9
SUM OF ALL RESPONSES TO PHQ9 QUESTIONS 1 AND 2: 0
1. LITTLE INTEREST OR PLEASURE IN DOING THINGS: NOT AT ALL
2. FEELING DOWN, DEPRESSED OR HOPELESS: NOT AT ALL

## 2025-04-15 ASSESSMENT — PAIN SCALES - GENERAL: PAINLEVEL_OUTOF10: 0-NO PAIN

## 2025-04-15 NOTE — PROGRESS NOTES
"Mony Rea  is 25 y.o. who presents for repeat pap due to mild dysplasia.       Chief Complaint   Patient presents with    Gynecologic Exam     Pt here for 6month pap Hx of LGSIL. LMP-3/16/2025. Pt has no concerns        Review of Systems:   Constitutional: No fever or chills  Respiratory: No shortness of breath, or cough  Cardiovascular: No chest pain or syncope  Breasts: No breast pain, no masses, no nipple discharge  Gastrointestinal: No nausea, vomiting, or diarrhea, no abdominal pain  Genitourinary: No dysuria or frequency  Gynecology: Negative except as noted in history of present illness  All other: All other systems reviewed and negative for complaint    /72   Ht 1.6 m (5' 3\")   Wt 78.2 kg (172 lb 8 oz)   LMP 03/16/2025 (Exact Date)   BMI 30.56 kg/m²   OBGyn Exam  Physical Exam:  Chaperone present for exam: Maricarmen (Lilliam) Hiren MA  Well-developed, well nourished, in no acute distress, alert and oriented x three, is pleasant and cooperative.  HEENT: Clear. Pupils equal, round and reactive to light and accommodation. Extraocular muscles are intact. Oral mucosa pink without exudate.   NECK: No lymphadenopathy, no thyromegaly.  LUNGS: Clear bilaterally.  HEART: Regular rate and rhythm without murmurs.  ABDOMEN: Normoactive bowel sounds, soft and nontender, no guarding or rebound tenderness, no CVA tenderness.  EXTREMITIES: No clubbing, cyanosis or edema.  NEUROLOGIC:  Cranial nerves II-XII grossly intact.  :  Normal external female genitalia, normal vulva, normal vagina. Normal urethral meatus, urethra and bladder. Normal appearing cervix. Normal-sized uterus, no adnexal masses or tenderness. Pap smear performed today.      Assessment/ Plan:    Problem List Items Addressed This Visit    None  Visit Diagnoses       LGSIL on Pap smear of cervix        Relevant Orders    THINPREP PAP            1.  History of mild dysplasia  2.  Repeat Pap performed today  Follow-up in 6 months for annual exam " and Pap

## 2025-04-28 LAB
CYTOLOGY CMNT CVX/VAG CYTO-IMP: NORMAL
LAB AP HPV GENOTYPE QUESTION: YES
LAB AP HPV HR: NORMAL
LABORATORY COMMENT REPORT: NORMAL
PATH REPORT.TOTAL CANCER: NORMAL

## 2025-08-05 ENCOUNTER — OFFICE VISIT (OUTPATIENT)
Facility: CLINIC | Age: 25
End: 2025-08-05
Payer: MEDICAID

## 2025-08-05 VITALS
WEIGHT: 162.5 LBS | DIASTOLIC BLOOD PRESSURE: 66 MMHG | HEIGHT: 63 IN | BODY MASS INDEX: 28.79 KG/M2 | SYSTOLIC BLOOD PRESSURE: 124 MMHG

## 2025-08-05 DIAGNOSIS — N91.2 AMENORRHEA: Primary | ICD-10-CM

## 2025-08-05 PROCEDURE — 3008F BODY MASS INDEX DOCD: CPT | Performed by: OBSTETRICS & GYNECOLOGY

## 2025-08-05 PROCEDURE — 99213 OFFICE O/P EST LOW 20 MIN: CPT | Performed by: OBSTETRICS & GYNECOLOGY

## 2025-08-05 ASSESSMENT — PAIN SCALES - GENERAL: PAINLEVEL_OUTOF10: 0-NO PAIN

## 2025-08-05 NOTE — PROGRESS NOTES
Mony Rea is a 25 y.o. year old female patient.  PCP = No Assigned PCP Generic Provider, MD    Chief Complaint   Patient presents with    Amenorrhea     Patient is here due to amenorrhea. LMP: 25. Patient states she is still breastfeeding/ pumping 3 times a day. Patient took a home pregnancy test and it was negative.       HPI   Presents states that her last menstrual period was 2025.  She is still breast-feeding and did a home pregnancy test which was negative about a week ago.  She is actively trying to conceive.  She denies any breast issues.  Denies any changes in activity, diet or medications.    OB History          1    Para   1    Term   1            AB        Living   1         SAB        IAB        Ectopic        Multiple        Live Births   1                 Medical History[1]    Surgical History[2]    Review of Systems:   Constitutional: No fever or chills  Respiratory: No shortness of breath, or cough  Cardiovascular: No chest pain or syncope  Breasts: No breast pain, no masses, no nipple discharge  Gastrointestinal: No nausea, vomiting, or diarrhea, no abdominal pain  Genitourinary: No dysuria or frequency  Gynecology: Negative except as noted in history of present illness  All other: All other systems reviewed and negative for complaint    Medication Documentation Review Audit       Reviewed by Kalin Connors MD (Physician) on 25 at 1510      Medication Order Taking? Sig Documenting Provider Last Dose Status   atomoxetine (Strattera) 40 mg capsule [id number]  Take 1 capsule (40 mg) by mouth early in the morning.. Historical Provider, MD  Active   buprenorphine (Subtex) 8 mg [id number]  Place 1 tablet (8 mg) under the tongue once daily. Historical Provider, MD  Active   hydrOXYzine pamoate (Vistaril) 25 mg capsule [id number]  Take 1 capsule (25 mg) by mouth 3 times a day. Historical Provider, MD  Active   metoclopramide (Reglan) 5 mg tablet [id number]  Take 1  "tablet (5 mg) by mouth 3 times a day as needed. Historical Provider, MD   01/10/24 5938   prenatal vitamin calcium-iron-folic 27 mg iron- 1 mg tablet [id number]  Take 1 tablet by mouth once daily. Historical Provider, MD  Active   venlafaxine XR (Effexor-XR) 37.5 mg 24 hr capsule [id number]  Take 1 capsule (37.5 mg) by mouth early in the morning.. Historical Provider, MD  Active   Vyvanse 30 mg capsule [id number]  Take 1 capsule (30 mg) by mouth early in the morning.. Historical Provider, MD  Active                     /66   Ht 1.6 m (5' 3\")   Wt 73.7 kg (162 lb 8 oz)   LMP 2025 (Exact Date)   Breastfeeding Yes   BMI 28.79 kg/m²     PHYSICAL EXAMINATION:  General: No acute distress  Eye: Intraocular movements are intact  HEENT: Normocephalic  Respiratory: Respirations are nonlabored  Gastrointestinal: Nondistended   Musculoskeletal: Normal range of motion  Neurologic: Alert and oriented x3  Psychiatric: Cooperative, appropriate mood and affect.      Problem List Items Addressed This Visit    None  Visit Diagnoses         Amenorrhea    -  Primary    Relevant Orders    QUEST HCG, TOTAL, QN             Provider Impression:  1.  Amenorrhea  Recommend obtaining a quantitative hCG today and she will be informed of the results.  She is encouraged to call if she still has not had a menstrual flow within the next month.  Otherwise, follow-up in the office as previously scheduled.         [1] History reviewed. No pertinent past medical history.  [2]   Past Surgical History:  Procedure Laterality Date     SECTION, LOW TRANSVERSE  2024    COLPOSCOPY  2024    LGSIL     "

## 2025-08-06 LAB — B-HCG SERPL-ACNC: <5 MIU/ML

## 2025-10-16 ENCOUNTER — APPOINTMENT (OUTPATIENT)
Dept: GASTROENTEROLOGY | Facility: CLINIC | Age: 25
End: 2025-10-16
Payer: MEDICAID

## 2025-10-17 ENCOUNTER — APPOINTMENT (OUTPATIENT)
Facility: CLINIC | Age: 25
End: 2025-10-17
Payer: MEDICAID